# Patient Record
Sex: FEMALE | Race: WHITE | URBAN - METROPOLITAN AREA
[De-identification: names, ages, dates, MRNs, and addresses within clinical notes are randomized per-mention and may not be internally consistent; named-entity substitution may affect disease eponyms.]

---

## 2017-12-06 ENCOUNTER — OUTPATIENT (OUTPATIENT)
Dept: OUTPATIENT SERVICES | Facility: HOSPITAL | Age: 67
LOS: 1 days | Discharge: HOME | End: 2017-12-06

## 2017-12-06 DIAGNOSIS — C50.111 MALIGNANT NEOPLASM OF CENTRAL PORTION OF RIGHT FEMALE BREAST: ICD-10-CM

## 2021-07-28 ENCOUNTER — NON-APPOINTMENT (OUTPATIENT)
Age: 71
End: 2021-07-28

## 2021-07-28 ENCOUNTER — APPOINTMENT (OUTPATIENT)
Dept: ORTHOPEDIC SURGERY | Facility: CLINIC | Age: 71
End: 2021-07-28
Payer: MEDICARE

## 2021-07-28 DIAGNOSIS — Z85.3 PERSONAL HISTORY OF MALIGNANT NEOPLASM OF BREAST: ICD-10-CM

## 2021-07-28 DIAGNOSIS — Z78.9 OTHER SPECIFIED HEALTH STATUS: ICD-10-CM

## 2021-07-28 DIAGNOSIS — R73.03 PREDIABETES.: ICD-10-CM

## 2021-07-28 PROCEDURE — 99204 OFFICE O/P NEW MOD 45 MIN: CPT | Mod: 57

## 2021-07-28 PROCEDURE — 99072 ADDL SUPL MATRL&STAF TM PHE: CPT

## 2021-07-28 RX ORDER — IBUPROFEN 200 MG/1
200 TABLET ORAL
Refills: 0 | Status: ACTIVE | COMMUNITY
Start: 2021-07-28

## 2021-07-28 NOTE — PHYSICAL EXAM
[de-identified] : General appearance: well nourished and hydrated, pleasant, alert and oriented x 3, cooperative.\par Cardiovascular: no apparent abnormalities, no lower leg edema, no varicosities, pedal pulses are palpable.\par Neurologic: sensation is normal, no muscle weakness in upper or lower extremities\par Dermatologic no apparent skin lesions, moist, warm, no rash.\par Gait: nonantalgic.\par \par Left knee\par Inspection: no effusion or erythema.\par Wounds: none.\par Alignment: normal.\par Palpation: no specific tenderness on palpation.\par ROM: 0-100 degrees\par Ligamentous laxity: all ligaments appear stable\par Muscle Test: good quad strength.\par \par Right knee\par Inspection: no effusion or erythema.\par Wounds: none.\par Alignment: normal.\par Palpation: no specific tenderness on palpation.\par ROM: 0-100 degrees\par Ligamentous laxity: all ligaments appear stable\par Muscle Test: good quad strength.\par \par Left hip\par Inspection: No swelling or ecchymosis.\par Wounds: none.\par Palpation: non-tender.\par Stability: no instability.\par Strength: 5/5 all motor groups.\par ROM: Flexion to 70, ER 30,ABD 30 \par Leg length: equal.\par \par Right hip\par Inspection: No swelling or ecchymosis.\par Wounds: none.\par Palpation: non-tender.\par Stability: no instability.\par Strength: 5/5 all motor groups.\par ROM: ankylosed in a position of flexion and abduction, about 10 degrees ABD and 10 degrees of flexion \par Leg length: equal.\par \par   [de-identified] : Radiographs done outside  AP pelvis and lateral of both hips shows left hip hip has complete loss of superior lateral joint space. The right hip has complete loss of superior lateral joint space with migration of femoral head superior laterally with large medial osteophytes between femoral head and tear drop with singificant shortening.

## 2021-07-28 NOTE — HISTORY OF PRESENT ILLNESS
[de-identified] : 71 year old female presents to the office today complaining of right hip pain x 1 year. When asked where the hip pain is, patient reports pain that is at the lateral hip, unsure if it is in the groin. There is also pain at the anterior thigh radiating to the knee. Patient reports pain that is sharp and achy in nature. She reports a loss of motion in the hip. PAtient ambulates with a cane and states she can only ambulate about 2 blocks before pain stops her. There is pain and difficulty with negotiating stairs along with difficulty putting socks and shoes on. Pt reports taking Ibuprofen for pain with only some relief. Patient is here today to discuss her next options for pain relief.

## 2021-11-02 ENCOUNTER — RESULT REVIEW (OUTPATIENT)
Age: 71
End: 2021-11-02

## 2021-11-02 ENCOUNTER — OUTPATIENT (OUTPATIENT)
Dept: OUTPATIENT SERVICES | Facility: HOSPITAL | Age: 71
LOS: 1 days | Discharge: HOME | End: 2021-11-02
Payer: MEDICARE

## 2021-11-02 VITALS
RESPIRATION RATE: 16 BRPM | OXYGEN SATURATION: 100 % | TEMPERATURE: 97 F | SYSTOLIC BLOOD PRESSURE: 130 MMHG | WEIGHT: 162.04 LBS | HEIGHT: 65 IN | DIASTOLIC BLOOD PRESSURE: 78 MMHG | HEART RATE: 82 BPM

## 2021-11-02 DIAGNOSIS — Z95.828 PRESENCE OF OTHER VASCULAR IMPLANTS AND GRAFTS: Chronic | ICD-10-CM

## 2021-11-02 DIAGNOSIS — M16.11 UNILATERAL PRIMARY OSTEOARTHRITIS, RIGHT HIP: ICD-10-CM

## 2021-11-02 DIAGNOSIS — Z01.818 ENCOUNTER FOR OTHER PREPROCEDURAL EXAMINATION: ICD-10-CM

## 2021-11-02 DIAGNOSIS — Z98.890 OTHER SPECIFIED POSTPROCEDURAL STATES: Chronic | ICD-10-CM

## 2021-11-02 LAB
A1C WITH ESTIMATED AVERAGE GLUCOSE RESULT: 5 % — SIGNIFICANT CHANGE UP (ref 4–5.6)
ALBUMIN SERPL ELPH-MCNC: 4.2 G/DL — SIGNIFICANT CHANGE UP (ref 3.5–5.2)
ALP SERPL-CCNC: 101 U/L — SIGNIFICANT CHANGE UP (ref 30–115)
ALT FLD-CCNC: 20 U/L — SIGNIFICANT CHANGE UP (ref 0–41)
ANION GAP SERPL CALC-SCNC: 13 MMOL/L — SIGNIFICANT CHANGE UP (ref 7–14)
APTT BLD: 33.6 SEC — SIGNIFICANT CHANGE UP (ref 27–39.2)
AST SERPL-CCNC: 22 U/L — SIGNIFICANT CHANGE UP (ref 0–41)
BASOPHILS # BLD AUTO: 0.05 K/UL — SIGNIFICANT CHANGE UP (ref 0–0.2)
BASOPHILS NFR BLD AUTO: 0.8 % — SIGNIFICANT CHANGE UP (ref 0–1)
BILIRUB SERPL-MCNC: 0.6 MG/DL — SIGNIFICANT CHANGE UP (ref 0.2–1.2)
BLD GP AB SCN SERPL QL: SIGNIFICANT CHANGE UP
BUN SERPL-MCNC: 17 MG/DL — SIGNIFICANT CHANGE UP (ref 10–20)
CALCIUM SERPL-MCNC: 9.5 MG/DL — SIGNIFICANT CHANGE UP (ref 8.5–10.1)
CHLORIDE SERPL-SCNC: 104 MMOL/L — SIGNIFICANT CHANGE UP (ref 98–110)
CO2 SERPL-SCNC: 24 MMOL/L — SIGNIFICANT CHANGE UP (ref 17–32)
CREAT SERPL-MCNC: 0.6 MG/DL — LOW (ref 0.7–1.5)
EOSINOPHIL # BLD AUTO: 0.15 K/UL — SIGNIFICANT CHANGE UP (ref 0–0.7)
EOSINOPHIL NFR BLD AUTO: 2.4 % — SIGNIFICANT CHANGE UP (ref 0–8)
ESTIMATED AVERAGE GLUCOSE: 97 MG/DL — SIGNIFICANT CHANGE UP (ref 68–114)
GLUCOSE SERPL-MCNC: 67 MG/DL — LOW (ref 70–99)
HCT VFR BLD CALC: 41.2 % — SIGNIFICANT CHANGE UP (ref 37–47)
HGB BLD-MCNC: 13.8 G/DL — SIGNIFICANT CHANGE UP (ref 12–16)
IMM GRANULOCYTES NFR BLD AUTO: 0.2 % — SIGNIFICANT CHANGE UP (ref 0.1–0.3)
INR BLD: 1.01 RATIO — SIGNIFICANT CHANGE UP (ref 0.65–1.3)
LYMPHOCYTES # BLD AUTO: 2.01 K/UL — SIGNIFICANT CHANGE UP (ref 1.2–3.4)
LYMPHOCYTES # BLD AUTO: 32.2 % — SIGNIFICANT CHANGE UP (ref 20.5–51.1)
MCHC RBC-ENTMCNC: 30.6 PG — SIGNIFICANT CHANGE UP (ref 27–31)
MCHC RBC-ENTMCNC: 33.5 G/DL — SIGNIFICANT CHANGE UP (ref 32–37)
MCV RBC AUTO: 91.4 FL — SIGNIFICANT CHANGE UP (ref 81–99)
MONOCYTES # BLD AUTO: 0.37 K/UL — SIGNIFICANT CHANGE UP (ref 0.1–0.6)
MONOCYTES NFR BLD AUTO: 5.9 % — SIGNIFICANT CHANGE UP (ref 1.7–9.3)
MRSA PCR RESULT.: NEGATIVE — SIGNIFICANT CHANGE UP
NEUTROPHILS # BLD AUTO: 3.66 K/UL — SIGNIFICANT CHANGE UP (ref 1.4–6.5)
NEUTROPHILS NFR BLD AUTO: 58.5 % — SIGNIFICANT CHANGE UP (ref 42.2–75.2)
NRBC # BLD: 0 /100 WBCS — SIGNIFICANT CHANGE UP (ref 0–0)
PLATELET # BLD AUTO: 237 K/UL — SIGNIFICANT CHANGE UP (ref 130–400)
POTASSIUM SERPL-MCNC: 4 MMOL/L — SIGNIFICANT CHANGE UP (ref 3.5–5)
POTASSIUM SERPL-SCNC: 4 MMOL/L — SIGNIFICANT CHANGE UP (ref 3.5–5)
PROT SERPL-MCNC: 7.1 G/DL — SIGNIFICANT CHANGE UP (ref 6–8)
PROTHROM AB SERPL-ACNC: 11.6 SEC — SIGNIFICANT CHANGE UP (ref 9.95–12.87)
RBC # BLD: 4.51 M/UL — SIGNIFICANT CHANGE UP (ref 4.2–5.4)
RBC # FLD: 12.7 % — SIGNIFICANT CHANGE UP (ref 11.5–14.5)
SODIUM SERPL-SCNC: 141 MMOL/L — SIGNIFICANT CHANGE UP (ref 135–146)
WBC # BLD: 6.25 K/UL — SIGNIFICANT CHANGE UP (ref 4.8–10.8)
WBC # FLD AUTO: 6.25 K/UL — SIGNIFICANT CHANGE UP (ref 4.8–10.8)

## 2021-11-02 PROCEDURE — 93010 ELECTROCARDIOGRAM REPORT: CPT

## 2021-11-02 PROCEDURE — 71046 X-RAY EXAM CHEST 2 VIEWS: CPT | Mod: 26

## 2021-11-02 PROCEDURE — 73502 X-RAY EXAM HIP UNI 2-3 VIEWS: CPT | Mod: 26,RT

## 2021-11-02 NOTE — H&P PST ADULT - NSANTHOSAYNRD_GEN_A_CORE
No. FIORDALIZA screening performed.  STOP BANG Legend: 0-2 = LOW Risk; 3-4 = INTERMEDIATE Risk; 5-8 = HIGH Risk

## 2021-11-02 NOTE — H&P PST ADULT - NSICDXPASTSURGICALHX_GEN_ALL_CORE_FT
PAST SURGICAL HISTORY:  H/O ovarian cystectomy     Port-A-Cath in place     S/P lumpectomy of breast

## 2021-11-02 NOTE — H&P PST ADULT - HISTORY OF PRESENT ILLNESS
Pt states she has had OA in both hips for many years. Right is worse than left. Since 2020, pain in right hip has been worsening. Described as stabbing pain rated 9/10 on most days. Pain is worse when trying to lift her leg or when attempting to be active. Admits to using Tylenol for pain but does not get much relief. Now for listed procedure.     Denies any chest pain, difficulty breathing, SOB, palpitations, dysuria, URI, or any other infections in the last 2 weeks. Denies any recent travel, contact, or exposure to any persons with known or suspected COVID-19. Pt also denies COVID testing within the last 2 weeks. Pt admits to receiving all doses of Moderna COVID vaccine. Denies any suicidal or homicidal ideations. Pt advised to self quarantine until day of procedure. Exercise tolerance of 1 flight of stairs without dyspnea but pt has been severely limited d/t hip pain. FIORDALIZA reviewed with patient. Pt verbalized understanding of all pre-operative instructions.    Anesthesia Alert  NO--Difficult Airway  NO--History of neck surgery or radiation  NO--Limited ROM of neck  NO--History of Malignant hyperthermia  NO--No personal or family history of Pseudocholinesterase deficiency.  YES--Prior Anesthesia Complication: delayed awakening  NO--Latex Allergy  NO--Loose teeth  NO--History of Rheumatoid Arthritis  NO--FIORDALIZA  NO--Bleeding Risk  NO--Other_____

## 2021-11-02 NOTE — H&P PST ADULT - REASON FOR ADMISSION
72 yo female presents for PAST in preparation for right total hip replacement on 11/11/2021 under regional anesthesia by Dr. Anderson ([facility]).

## 2021-11-08 ENCOUNTER — LABORATORY RESULT (OUTPATIENT)
Age: 71
End: 2021-11-08

## 2021-11-08 ENCOUNTER — OUTPATIENT (OUTPATIENT)
Dept: OUTPATIENT SERVICES | Facility: HOSPITAL | Age: 71
LOS: 1 days | Discharge: HOME | End: 2021-11-08

## 2021-11-08 DIAGNOSIS — Z95.828 PRESENCE OF OTHER VASCULAR IMPLANTS AND GRAFTS: Chronic | ICD-10-CM

## 2021-11-08 DIAGNOSIS — Z98.890 OTHER SPECIFIED POSTPROCEDURAL STATES: Chronic | ICD-10-CM

## 2021-11-08 DIAGNOSIS — Z11.59 ENCOUNTER FOR SCREENING FOR OTHER VIRAL DISEASES: ICD-10-CM

## 2021-11-10 ENCOUNTER — FORM ENCOUNTER (OUTPATIENT)
Age: 71
End: 2021-11-10

## 2021-11-11 ENCOUNTER — RESULT REVIEW (OUTPATIENT)
Age: 71
End: 2021-11-11

## 2021-11-11 ENCOUNTER — APPOINTMENT (OUTPATIENT)
Dept: ORTHOPEDIC SURGERY | Facility: HOSPITAL | Age: 71
End: 2021-11-11
Payer: MEDICARE

## 2021-11-11 ENCOUNTER — OUTPATIENT (OUTPATIENT)
Dept: INPATIENT UNIT | Facility: HOSPITAL | Age: 71
LOS: 1 days | Discharge: HOME HEALTH | End: 2021-11-11
Payer: MEDICARE

## 2021-11-11 VITALS
SYSTOLIC BLOOD PRESSURE: 153 MMHG | RESPIRATION RATE: 18 BRPM | HEIGHT: 67 IN | DIASTOLIC BLOOD PRESSURE: 73 MMHG | TEMPERATURE: 97 F | OXYGEN SATURATION: 100 % | HEART RATE: 65 BPM | WEIGHT: 162.04 LBS

## 2021-11-11 DIAGNOSIS — Z98.890 OTHER SPECIFIED POSTPROCEDURAL STATES: Chronic | ICD-10-CM

## 2021-11-11 DIAGNOSIS — M16.11 UNILATERAL PRIMARY OSTEOARTHRITIS, RIGHT HIP: ICD-10-CM

## 2021-11-11 DIAGNOSIS — Z95.828 PRESENCE OF OTHER VASCULAR IMPLANTS AND GRAFTS: Chronic | ICD-10-CM

## 2021-11-11 LAB
ABO RH CONFIRMATION: SIGNIFICANT CHANGE UP
GLUCOSE BLDC GLUCOMTR-MCNC: 171 MG/DL — HIGH (ref 70–99)
GLUCOSE BLDC GLUCOMTR-MCNC: 81 MG/DL — SIGNIFICANT CHANGE UP (ref 70–99)

## 2021-11-11 PROCEDURE — 99231 SBSQ HOSP IP/OBS SF/LOW 25: CPT

## 2021-11-11 PROCEDURE — 88305 TISSUE EXAM BY PATHOLOGIST: CPT | Mod: 26

## 2021-11-11 PROCEDURE — 27130 TOTAL HIP ARTHROPLASTY: CPT | Mod: RT

## 2021-11-11 PROCEDURE — 88311 DECALCIFY TISSUE: CPT | Mod: 26

## 2021-11-11 RX ORDER — PANTOPRAZOLE SODIUM 20 MG/1
40 TABLET, DELAYED RELEASE ORAL
Refills: 0 | Status: DISCONTINUED | OUTPATIENT
Start: 2021-11-11 | End: 2021-11-12

## 2021-11-11 RX ORDER — OXYCODONE AND ACETAMINOPHEN 5; 325 MG/1; MG/1
1 TABLET ORAL ONCE
Refills: 0 | Status: DISCONTINUED | OUTPATIENT
Start: 2021-11-11 | End: 2021-11-11

## 2021-11-11 RX ORDER — CELECOXIB 200 MG/1
200 CAPSULE ORAL EVERY 12 HOURS
Refills: 0 | Status: DISCONTINUED | OUTPATIENT
Start: 2021-11-12 | End: 2021-11-12

## 2021-11-11 RX ORDER — DEXAMETHASONE 0.5 MG/5ML
2 ELIXIR ORAL ONCE
Refills: 0 | Status: COMPLETED | OUTPATIENT
Start: 2021-11-12 | End: 2021-11-12

## 2021-11-11 RX ORDER — HYDROMORPHONE HYDROCHLORIDE 2 MG/ML
0.5 INJECTION INTRAMUSCULAR; INTRAVENOUS; SUBCUTANEOUS
Refills: 0 | Status: DISCONTINUED | OUTPATIENT
Start: 2021-11-11 | End: 2021-11-11

## 2021-11-11 RX ORDER — OXYCODONE HYDROCHLORIDE 5 MG/1
5 TABLET ORAL EVERY 4 HOURS
Refills: 0 | Status: DISCONTINUED | OUTPATIENT
Start: 2021-11-11 | End: 2021-11-12

## 2021-11-11 RX ORDER — SODIUM CHLORIDE 9 MG/ML
1000 INJECTION INTRAMUSCULAR; INTRAVENOUS; SUBCUTANEOUS
Refills: 0 | Status: DISCONTINUED | OUTPATIENT
Start: 2021-11-11 | End: 2021-11-12

## 2021-11-11 RX ORDER — CELECOXIB 200 MG/1
200 CAPSULE ORAL ONCE
Refills: 0 | Status: COMPLETED | OUTPATIENT
Start: 2021-11-11 | End: 2021-11-11

## 2021-11-11 RX ORDER — TRAMADOL HYDROCHLORIDE 50 MG/1
50 TABLET ORAL EVERY 4 HOURS
Refills: 0 | Status: DISCONTINUED | OUTPATIENT
Start: 2021-11-11 | End: 2021-11-12

## 2021-11-11 RX ORDER — SENNA PLUS 8.6 MG/1
2 TABLET ORAL AT BEDTIME
Refills: 0 | Status: DISCONTINUED | OUTPATIENT
Start: 2021-11-11 | End: 2021-11-12

## 2021-11-11 RX ORDER — ACETAMINOPHEN 500 MG
650 TABLET ORAL EVERY 6 HOURS
Refills: 0 | Status: DISCONTINUED | OUTPATIENT
Start: 2021-11-11 | End: 2021-11-12

## 2021-11-11 RX ORDER — CHLORHEXIDINE GLUCONATE 213 G/1000ML
1 SOLUTION TOPICAL
Refills: 0 | Status: DISCONTINUED | OUTPATIENT
Start: 2021-11-11 | End: 2021-11-12

## 2021-11-11 RX ORDER — FERROUS SULFATE 325(65) MG
325 TABLET ORAL DAILY
Refills: 0 | Status: DISCONTINUED | OUTPATIENT
Start: 2021-11-11 | End: 2021-11-12

## 2021-11-11 RX ORDER — KETOROLAC TROMETHAMINE 30 MG/ML
15 SYRINGE (ML) INJECTION EVERY 6 HOURS
Refills: 0 | Status: DISCONTINUED | OUTPATIENT
Start: 2021-11-11 | End: 2021-11-12

## 2021-11-11 RX ORDER — CEFAZOLIN SODIUM 1 G
2000 VIAL (EA) INJECTION EVERY 8 HOURS
Refills: 0 | Status: COMPLETED | OUTPATIENT
Start: 2021-11-11 | End: 2021-11-11

## 2021-11-11 RX ORDER — SODIUM CHLORIDE 9 MG/ML
1000 INJECTION, SOLUTION INTRAVENOUS
Refills: 0 | Status: DISCONTINUED | OUTPATIENT
Start: 2021-11-11 | End: 2021-11-11

## 2021-11-11 RX ORDER — ONDANSETRON 8 MG/1
4 TABLET, FILM COATED ORAL EVERY 6 HOURS
Refills: 0 | Status: DISCONTINUED | OUTPATIENT
Start: 2021-11-11 | End: 2021-11-12

## 2021-11-11 RX ORDER — ASPIRIN/CALCIUM CARB/MAGNESIUM 324 MG
81 TABLET ORAL EVERY 12 HOURS
Refills: 0 | Status: DISCONTINUED | OUTPATIENT
Start: 2021-11-11 | End: 2021-11-12

## 2021-11-11 RX ORDER — POLYETHYLENE GLYCOL 3350 17 G/17G
17 POWDER, FOR SOLUTION ORAL AT BEDTIME
Refills: 0 | Status: DISCONTINUED | OUTPATIENT
Start: 2021-11-11 | End: 2021-11-12

## 2021-11-11 RX ORDER — ACETAMINOPHEN 500 MG
1000 TABLET ORAL ONCE
Refills: 0 | Status: COMPLETED | OUTPATIENT
Start: 2021-11-11 | End: 2021-11-11

## 2021-11-11 RX ADMIN — SODIUM CHLORIDE 100 MILLILITER(S): 9 INJECTION, SOLUTION INTRAVENOUS at 12:05

## 2021-11-11 RX ADMIN — Medication 15 MILLIGRAM(S): at 19:21

## 2021-11-11 RX ADMIN — Medication 650 MILLIGRAM(S): at 12:37

## 2021-11-11 RX ADMIN — HYDROMORPHONE HYDROCHLORIDE 0.5 MILLIGRAM(S): 2 INJECTION INTRAMUSCULAR; INTRAVENOUS; SUBCUTANEOUS at 12:03

## 2021-11-11 RX ADMIN — Medication 325 MILLIGRAM(S): at 12:37

## 2021-11-11 RX ADMIN — Medication 100 MILLIGRAM(S): at 17:36

## 2021-11-11 RX ADMIN — Medication 650 MILLIGRAM(S): at 23:57

## 2021-11-11 RX ADMIN — CELECOXIB 200 MILLIGRAM(S): 200 CAPSULE ORAL at 06:32

## 2021-11-11 RX ADMIN — SODIUM CHLORIDE 100 MILLILITER(S): 9 INJECTION INTRAMUSCULAR; INTRAVENOUS; SUBCUTANEOUS at 17:41

## 2021-11-11 RX ADMIN — Medication 81 MILLIGRAM(S): at 17:42

## 2021-11-11 RX ADMIN — Medication 100 MILLIGRAM(S): at 23:59

## 2021-11-11 RX ADMIN — Medication 1000 MILLIGRAM(S): at 06:32

## 2021-11-11 RX ADMIN — Medication 650 MILLIGRAM(S): at 19:20

## 2021-11-11 RX ADMIN — Medication 1 TABLET(S): at 12:36

## 2021-11-11 RX ADMIN — Medication 650 MILLIGRAM(S): at 13:51

## 2021-11-11 RX ADMIN — HYDROMORPHONE HYDROCHLORIDE 0.5 MILLIGRAM(S): 2 INJECTION INTRAMUSCULAR; INTRAVENOUS; SUBCUTANEOUS at 12:29

## 2021-11-11 RX ADMIN — Medication 650 MILLIGRAM(S): at 17:42

## 2021-11-11 RX ADMIN — Medication 15 MILLIGRAM(S): at 17:42

## 2021-11-11 RX ADMIN — SENNA PLUS 2 TABLET(S): 8.6 TABLET ORAL at 22:52

## 2021-11-11 RX ADMIN — POLYETHYLENE GLYCOL 3350 17 GRAM(S): 17 POWDER, FOR SOLUTION ORAL at 22:52

## 2021-11-11 NOTE — PHYSICAL THERAPY INITIAL EVALUATION ADULT - RANGE OF MOTION EXAMINATION, REHAB EVAL
(R) hip AAROM flexion 0-80 , abduction 0-15 degrees , rest RLE and LLE WFL grossly throughout , assessed in semi-reclined position

## 2021-11-11 NOTE — PHYSICAL THERAPY INITIAL EVALUATION ADULT - ADDITIONAL COMMENTS
Per patient , resides alone in a private home , but daughter will be staying with her upon D/C.  Ambulating with a cane prior to surgery, also has a rolling walker.  +5 steps to enter home from front entrance with railing on (R) side , or can enter through side door which has 5 steps and (L) railing.  12 steps down to basement, however, patient says she can remain on 1st floor only if needed. Ambulatory

## 2021-11-11 NOTE — ASU PREOP CHECKLIST - IV STARTED

## 2021-11-11 NOTE — PHYSICAL THERAPY INITIAL EVALUATION ADULT - GAIT DEVIATIONS NOTED, PT EVAL
decreased heel strike / push off , decreased WB RLE/decreased pola/increased time in double stance/decreased step length/decreased weight-shifting ability

## 2021-11-11 NOTE — PROGRESS NOTE ADULT - ASSESSMENT
70 yo F with no significant pmhx s/p right hip arthroplasty.    #Right hip arthritis s/p right hip arthroplasty 11/11  - Post-op care per orthopedics team  - Cont PT  - Pain control PRN    Vital signs stable. Labs reviewed without no significant abnormalities.    Please call for any questions or concerns  [phone]

## 2021-11-11 NOTE — PROGRESS NOTE ADULT - ASSESSMENT
s/p right thr pod 0       pain control   dvt proph   am labs pt ot   wbat   abx 24 hours   incentive   med cs   dispo planning

## 2021-11-11 NOTE — PHYSICAL THERAPY INITIAL EVALUATION ADULT - PATIENT/FAMILY/SIGNIFICANT OTHER GOALS STATEMENT, PT EVAL
Patient wants to be able to walk with rolling walker , wants to be able to clean/maintain her household

## 2021-11-11 NOTE — PHYSICAL THERAPY INITIAL EVALUATION ADULT - IMPAIRED TRANSFERS: SIT/STAND, REHAB EVAL
decreased endurance/impaired balance/decreased flexibility/narrow base of support/impaired postural control/decreased ROM/decreased strength

## 2021-11-11 NOTE — PHYSICAL THERAPY INITIAL EVALUATION ADULT - GENERAL OBSERVATIONS, REHAB EVAL
13:55 - 14:25. Chart reviewed. Patient available to be seen for physical therapy, confirmed with nurse. Patient encountered semi-reclined in bed, +aquacel (R) hip. Denies pain at rest and is ready for PT evaluation now.

## 2021-11-12 ENCOUNTER — TRANSCRIPTION ENCOUNTER (OUTPATIENT)
Age: 71
End: 2021-11-12

## 2021-11-12 VITALS
HEART RATE: 77 BPM | SYSTOLIC BLOOD PRESSURE: 113 MMHG | TEMPERATURE: 97 F | RESPIRATION RATE: 16 BRPM | DIASTOLIC BLOOD PRESSURE: 77 MMHG

## 2021-11-12 LAB
ANION GAP SERPL CALC-SCNC: 12 MMOL/L — SIGNIFICANT CHANGE UP (ref 7–14)
BUN SERPL-MCNC: 22 MG/DL — HIGH (ref 10–20)
CALCIUM SERPL-MCNC: 8.6 MG/DL — SIGNIFICANT CHANGE UP (ref 8.5–10.1)
CHLORIDE SERPL-SCNC: 106 MMOL/L — SIGNIFICANT CHANGE UP (ref 98–110)
CO2 SERPL-SCNC: 23 MMOL/L — SIGNIFICANT CHANGE UP (ref 17–32)
CREAT SERPL-MCNC: 0.6 MG/DL — LOW (ref 0.7–1.5)
GLUCOSE SERPL-MCNC: 102 MG/DL — HIGH (ref 70–99)
HCT VFR BLD CALC: 29.5 % — LOW (ref 37–47)
HGB BLD-MCNC: 10.1 G/DL — LOW (ref 12–16)
MCHC RBC-ENTMCNC: 30.7 PG — SIGNIFICANT CHANGE UP (ref 27–31)
MCHC RBC-ENTMCNC: 34.2 G/DL — SIGNIFICANT CHANGE UP (ref 32–37)
MCV RBC AUTO: 89.7 FL — SIGNIFICANT CHANGE UP (ref 81–99)
NRBC # BLD: 0 /100 WBCS — SIGNIFICANT CHANGE UP (ref 0–0)
PLATELET # BLD AUTO: 178 K/UL — SIGNIFICANT CHANGE UP (ref 130–400)
POTASSIUM SERPL-MCNC: 4.2 MMOL/L — SIGNIFICANT CHANGE UP (ref 3.5–5)
POTASSIUM SERPL-SCNC: 4.2 MMOL/L — SIGNIFICANT CHANGE UP (ref 3.5–5)
RBC # BLD: 3.29 M/UL — LOW (ref 4.2–5.4)
RBC # FLD: 12.2 % — SIGNIFICANT CHANGE UP (ref 11.5–14.5)
SODIUM SERPL-SCNC: 141 MMOL/L — SIGNIFICANT CHANGE UP (ref 135–146)
WBC # BLD: 10.63 K/UL — SIGNIFICANT CHANGE UP (ref 4.8–10.8)
WBC # FLD AUTO: 10.63 K/UL — SIGNIFICANT CHANGE UP (ref 4.8–10.8)

## 2021-11-12 RX ORDER — OXYCODONE HYDROCHLORIDE 5 MG/1
1 TABLET ORAL
Qty: 12 | Refills: 0
Start: 2021-11-12 | End: 2021-11-15

## 2021-11-12 RX ORDER — ACETAMINOPHEN 500 MG
2 TABLET ORAL
Qty: 0 | Refills: 0 | DISCHARGE

## 2021-11-12 RX ORDER — PANTOPRAZOLE SODIUM 20 MG/1
1 TABLET, DELAYED RELEASE ORAL
Qty: 40 | Refills: 0
Start: 2021-11-12 | End: 2021-12-21

## 2021-11-12 RX ORDER — ASPIRIN/CALCIUM CARB/MAGNESIUM 324 MG
1 TABLET ORAL
Qty: 80 | Refills: 0
Start: 2021-11-12 | End: 2021-12-21

## 2021-11-12 RX ORDER — CELECOXIB 200 MG/1
1 CAPSULE ORAL
Qty: 28 | Refills: 0
Start: 2021-11-12 | End: 2021-11-25

## 2021-11-12 RX ORDER — ACETAMINOPHEN 500 MG
2 TABLET ORAL
Qty: 96 | Refills: 0
Start: 2021-11-12 | End: 2021-11-23

## 2021-11-12 RX ORDER — TRAMADOL HYDROCHLORIDE 50 MG/1
1 TABLET ORAL
Qty: 24 | Refills: 0
Start: 2021-11-12 | End: 2021-11-17

## 2021-11-12 RX ADMIN — Medication 2 MILLIGRAM(S): at 10:03

## 2021-11-12 RX ADMIN — Medication 15 MILLIGRAM(S): at 07:53

## 2021-11-12 RX ADMIN — Medication 1 TABLET(S): at 11:35

## 2021-11-12 RX ADMIN — Medication 15 MILLIGRAM(S): at 11:36

## 2021-11-12 RX ADMIN — Medication 81 MILLIGRAM(S): at 05:19

## 2021-11-12 RX ADMIN — Medication 325 MILLIGRAM(S): at 11:36

## 2021-11-12 RX ADMIN — Medication 650 MILLIGRAM(S): at 07:53

## 2021-11-12 RX ADMIN — Medication 650 MILLIGRAM(S): at 05:18

## 2021-11-12 RX ADMIN — PANTOPRAZOLE SODIUM 40 MILLIGRAM(S): 20 TABLET, DELAYED RELEASE ORAL at 05:19

## 2021-11-12 RX ADMIN — Medication 650 MILLIGRAM(S): at 01:00

## 2021-11-12 RX ADMIN — Medication 15 MILLIGRAM(S): at 05:18

## 2021-11-12 NOTE — DISCHARGE NOTE PROVIDER - NSDCMRMEDTOKEN_GEN_ALL_CORE_FT
acetaminophen 325 mg oral tablet: 2 tab(s) orally every 6 hours MDD:8  aspirin 81 mg oral delayed release tablet: 1 tab(s) orally every 12 hours MDD:2  celecoxib 200 mg oral capsule: 1 cap(s) orally every 12 hours MDD:2  oxyCODONE 5 mg oral tablet: 1 tab(s) orally every 8 hours, As Needed -Severe Pain (7 - 10) MDD:3  pantoprazole 40 mg oral delayed release tablet: 1 tab(s) orally once a day (before a meal) MDD:1  traMADol 50 mg oral tablet: 1 tab(s) orally every 6 hours, As Needed -Moderate Pain (4 - 6) MDD:4

## 2021-11-12 NOTE — OCCUPATIONAL THERAPY INITIAL EVALUATION ADULT - GENERAL OBSERVATIONS, REHAB EVAL
11:40-12:25 chart reviewed, ok to treat by Occupational Therapist as confirmed by RN Irina Pt received sitting in bedside chair + aquacel dressing R hip in NAD. Pt. in agreement with OT IE

## 2021-11-12 NOTE — PROGRESS NOTE ADULT - SUBJECTIVE AND OBJECTIVE BOX
71 y o F s/p right preet pod 1    MEDICATIONS  (STANDING):  acetaminophen     Tablet .. 650 milliGRAM(s) Oral every 6 hours  aspirin enteric coated 81 milliGRAM(s) Oral every 12 hours  celecoxib 200 milliGRAM(s) Oral every 12 hours  chlorhexidine 4% Liquid 1 Application(s) Topical <User Schedule>  dexAMETHasone     Tablet 2 milliGRAM(s) Oral once  ferrous    sulfate 325 milliGRAM(s) Oral daily  ketorolac   Injectable 15 milliGRAM(s) IV Push every 6 hours  multivitamin 1 Tablet(s) Oral daily  pantoprazole    Tablet 40 milliGRAM(s) Oral before breakfast  polyethylene glycol 3350 17 Gram(s) Oral at bedtime  senna 2 Tablet(s) Oral at bedtime  sodium chloride 0.9%. 1000 milliLiter(s) (100 mL/Hr) IV Continuous <Continuous>    MEDICATIONS  (PRN):  aluminum hydroxide/magnesium hydroxide/simethicone Suspension 30 milliLiter(s) Oral four times a day PRN Indigestion  ondansetron Injectable 4 milliGRAM(s) IV Push every 6 hours PRN Nausea and/or Vomiting  oxyCODONE    IR 5 milliGRAM(s) Oral every 4 hours PRN Severe Pain (7 - 10)  traMADol 50 milliGRAM(s) Oral every 4 hours PRN Moderate Pain (4 - 6)      Pt s/e at bedside, pain is controlled with pain meds, no CP, SOB, N/V, walked , no problems with urination      NAD    Vital Signs Last 24 Hrs  T(C): 35.7 (12 Nov 2021 04:00), Max: 36.5 (11 Nov 2021 11:43)  T(F): 96.3 (12 Nov 2021 04:00), Max: 97.7 (11 Nov 2021 11:43)  HR: 63 (12 Nov 2021 04:00) (52 - 86)  BP: 109/55 (12 Nov 2021 04:00) (105/77 - 137/58)  BP(mean): --  RR: 16 (12 Nov 2021 04:00) (16 - 17)  SpO2: 96% (11 Nov 2021 13:15) (95% - 97%)    PE:   dressing d/c/i  compartments soft  NVID, sensation intact  foot wwp                          10.1   10.63 )-----------( 178      ( 12 Nov 2021 07:18 )             29.5     bmp pending    PT/OT, wbat  pain control  dvt ppx - asa 81 bid x 6 weeks  GI ppx  f/u bmp  postop abx completed  home meds  IS  d/c planning  
  ZARIA, WAVE  71y, Female  Allergy: No Known Allergies    Hospital Day:     Patient seen and examined. No acute complaints. Pt ambulated well with PT today.    PMH/PSH:  PAST MEDICAL & SURGICAL HISTORY:  Breast CA    S/P lumpectomy of breast    Port-A-Cath in place    H/O ovarian cystectomy        VITALS:  T(F): 96.9 (11-11-21 @ 13:45), Max: 97.7 (11-11-21 @ 11:43)  HR: 52 (11-11-21 @ 13:45)  BP: 119/58 (11-11-21 @ 13:45) (105/77 - 153/73)  RR: 16 (11-11-21 @ 13:45)  SpO2: 96% (11-11-21 @ 13:15)    TESTS & MEASUREMENTS:  Weight (Kg): 73.5 (11-11-21 @ 07:10)  BMI (kg/m2): 25.4 (11-11)    11-11-21 @ 07:01  -  11-11-21 @ 16:39  --------------------------------------------------------  IN: 0 mL / OUT: 600 mL / NET: -600 mL                            RADIOLOGY & ADDITIONAL TESTS:    RECENT DIAGNOSTIC ORDERS:  Surgical Pathology Report: 09:39 (11-11-21 @ 14:36)  COVID-19 Alfonso Domain Antibody: AM Sched. Collection: 12-Nov-2021 04:30 (11-11-21 @ 10:51)  COVID-19 Nucleocapsid Antibody: AM Sched. Collection: 12-Nov-2021 04:30 (11-11-21 @ 10:51)  Xray Hip 2-3 Views, Right: 07:55  Exam Completed (11-11-21 @ 07:56)  Complete Blood Count: AM Sched. Collection: 12-Nov-2021 04:30 (11-11-21 @ 07:00)  Complete Blood Count: STAT  Addl Info: Upon arrival to PACU or Floor (11-11-21 @ 07:00)  Basic Metabolic Panel: AM Sched. Collection: 12-Nov-2021 04:30 (11-11-21 @ 07:00)  Diet, Regular (11-11-21 @ 07:00)      MEDICATIONS:  MEDICATIONS  (STANDING):  acetaminophen     Tablet .. 650 milliGRAM(s) Oral every 6 hours  aspirin enteric coated 81 milliGRAM(s) Oral every 12 hours  ceFAZolin   IVPB 2000 milliGRAM(s) IV Intermittent every 8 hours  chlorhexidine 4% Liquid 1 Application(s) Topical <User Schedule>  ferrous    sulfate 325 milliGRAM(s) Oral daily  ketorolac   Injectable 15 milliGRAM(s) IV Push every 6 hours  multivitamin 1 Tablet(s) Oral daily  pantoprazole    Tablet 40 milliGRAM(s) Oral before breakfast  polyethylene glycol 3350 17 Gram(s) Oral at bedtime  senna 2 Tablet(s) Oral at bedtime  sodium chloride 0.9%. 1000 milliLiter(s) (100 mL/Hr) IV Continuous <Continuous>    MEDICATIONS  (PRN):  aluminum hydroxide/magnesium hydroxide/simethicone Suspension 30 milliLiter(s) Oral four times a day PRN Indigestion  ondansetron Injectable 4 milliGRAM(s) IV Push every 6 hours PRN Nausea and/or Vomiting  oxyCODONE    IR 5 milliGRAM(s) Oral every 4 hours PRN Severe Pain (7 - 10)  traMADol 50 milliGRAM(s) Oral every 4 hours PRN Moderate Pain (4 - 6)      HOME MEDICATIONS:  acetaminophen 325 mg oral tablet (11-11)      REVIEW OF SYSTEMS:  All other review of systems is negative unless indicated above.     PHYSICAL EXAM:  PHYSICAL EXAM:  GENERAL: NAD, well-developed  HEAD:  Atraumatic, Normocephalic  NECK: Supple, No JVD  CHEST/LUNG: Clear to auscultation bilaterally; No wheeze  HEART: Regular rate and rhythm; No murmurs, rubs, or gallops  ABDOMEN: Soft, Nontender, Nondistended; Bowel sounds present  EXTREMITIES:  2+ Peripheral Pulses, No clubbing, cyanosis, or edema  PSYCH: AAOx3  SKIN: No rashes or lesions      
post op note   s/p right thr pod 0  seen at bedside no complaints pain controlled   ambulated with pt     Vital Signs Last 24 Hrs  T(C): 35.7 (11 Nov 2021 18:08), Max: 36.5 (11 Nov 2021 11:43)  T(F): 96.3 (11 Nov 2021 18:08), Max: 97.7 (11 Nov 2021 11:43)  HR: 69 (11 Nov 2021 18:08) (52 - 69)  BP: 132/60 (11 Nov 2021 18:08) (105/77 - 153/73)  BP(mean): --  RR: 16 (11 Nov 2021 18:08) (16 - 18)  SpO2: 96% (11 Nov 2021 13:15) (95% - 100%)    right hip: dressing in place c/d/i   nvid   no calf pain   soraya x 2

## 2021-11-12 NOTE — OCCUPATIONAL THERAPY INITIAL EVALUATION ADULT - NS ASR DRESSING EQUIP NEEDS
Pt. was advised on the benefit of AE for LBD in order to maintain total hip precautions, increase safety and independence. Pt. demonstrated good understanding and in agreement./sock aide/reacher/dressing stick/long handled shoe horn

## 2021-11-12 NOTE — ANESTHESIA FOLLOW-UP NOTE - NSINTERVIEW_GEN_ALL_CORE
Addended by: JENNA COKER on: 4/8/2019 10:09 AM     Modules accepted: Orders    
Interviewed and evaluated

## 2021-11-12 NOTE — OCCUPATIONAL THERAPY INITIAL EVALUATION ADULT - LIVES WITH, PROFILE
Lives alone in a private home, daughter will be staying with patient upon discharge. 5 steps to enter with R side railing, 5 steps from the side door with L side railing, and 12 steps to the basement. Pt. will be staying on the first floor and not using the basement +tub/alone

## 2021-11-12 NOTE — OCCUPATIONAL THERAPY INITIAL EVALUATION ADULT - LEVEL OF INDEPENDENCE: DRESS LOWER BODY, OT EVAL
Pt. performed dressing tasks in hospital clothing due to lack of personal clothes available./minimum assist (75% patients effort)

## 2021-11-12 NOTE — DISCHARGE NOTE PROVIDER - NSDCCPCAREPLAN_GEN_ALL_CORE_FT
PRINCIPAL DISCHARGE DIAGNOSIS  Diagnosis: Status post total hip replacement, right  Assessment and Plan of Treatment:

## 2021-11-12 NOTE — DISCHARGE NOTE PROVIDER - CARE PROVIDER_API CALL
Breezy Anderson Pete  Orthopedic Surgery  [address] [place] [id number]  Phone: [phone]  Fax: [phone]  Follow Up Time:

## 2021-11-12 NOTE — OCCUPATIONAL THERAPY INITIAL EVALUATION ADULT - REHAB POTENTIAL, OT EVAL
Pt. demonstrated good understanding of home safety, adaptive equipment and safe car transfer/good, to achieve stated therapy goals

## 2021-11-12 NOTE — DISCHARGE NOTE PROVIDER - NSDCCPGOAL_GEN_ALL_CORE_FT
To get better and follow your care plan as instructed. PT/OT, wbat, cont aspiriin 81 mg bid x 6 weeks for dvt ppx, cont protonix for GI ppx, keep postop dressing 1 week, may shower, f/u as OP in 2 weeks with dr Anderson

## 2021-11-12 NOTE — DISCHARGE NOTE PROVIDER - HOSPITAL COURSE
11/11/21- right total hip arthroplasty, routine postop course: PT/OT, wbat, pain control, DVT/GI ppx, postop abx, postop labs WNL

## 2021-11-12 NOTE — DISCHARGE NOTE NURSING/CASE MANAGEMENT/SOCIAL WORK - PATIENT PORTAL LINK FT
You can access the FollowMyHealth Patient Portal offered by [facility] by registering at the following website: [email or web address]. By joining [employer]’s FollowMyHealth portal, you will also be able to view your health information using other applications (apps) compatible with our system.

## 2021-11-13 LAB
COVID-19 NUCLEOCAPSID GAM AB INTERP: NEGATIVE — SIGNIFICANT CHANGE UP
COVID-19 NUCLEOCAPSID TOTAL GAM ANTIBODY RESULT: 0.08 INDEX — SIGNIFICANT CHANGE UP
COVID-19 SPIKE DOMAIN AB INTERP: POSITIVE
COVID-19 SPIKE DOMAIN ANTIBODY RESULT: >250 U/ML — HIGH
SARS-COV-2 IGG+IGM SERPL QL IA: 0.08 INDEX — SIGNIFICANT CHANGE UP
SARS-COV-2 IGG+IGM SERPL QL IA: >250 U/ML — HIGH
SARS-COV-2 IGG+IGM SERPL QL IA: NEGATIVE — SIGNIFICANT CHANGE UP
SARS-COV-2 IGG+IGM SERPL QL IA: POSITIVE

## 2021-11-15 LAB — SURGICAL PATHOLOGY STUDY: SIGNIFICANT CHANGE UP

## 2021-11-22 RX ORDER — TRAMADOL HYDROCHLORIDE 50 MG/1
50 TABLET, COATED ORAL
Qty: 28 | Refills: 0 | Status: ACTIVE | COMMUNITY
Start: 2021-11-22 | End: 1900-01-01

## 2021-11-24 ENCOUNTER — APPOINTMENT (OUTPATIENT)
Dept: ORTHOPEDIC SURGERY | Facility: CLINIC | Age: 71
End: 2021-11-24
Payer: MEDICARE

## 2021-11-24 VITALS — TEMPERATURE: 97.9 F

## 2021-11-24 PROCEDURE — 99024 POSTOP FOLLOW-UP VISIT: CPT

## 2021-11-24 RX ORDER — OXYCODONE 5 MG/1
5 TABLET ORAL
Qty: 21 | Refills: 0 | Status: ACTIVE | COMMUNITY
Start: 2021-11-24 | End: 1900-01-01

## 2021-11-24 NOTE — ASSESSMENT
[FreeTextEntry1] : 71 year old female approximately two weeks status post right total hip replacement. Staples were removed from the incision site and Steri-Strips were applied. Pt was instructed she may shower, allowing the soap and water to run over the incision site, but not to directly scrub over the wound. Pt is to continue Aspirin 81 mg twice daily for DVT prophylaxis. Pt is to follow up in three weeks for assessment. In the interim, if she develops any fevers, erythema, drainage from the incision site, or any concerning symptoms, we will see her prior to her scheduled appointment.

## 2021-11-24 NOTE — HISTORY OF PRESENT ILLNESS
[de-identified] : 71 year old female s/p right total hip replacement presents to the office today for her 2 week follow up. She is here today for staple removal. Patient is ambulating with a walker today. She reports taking Tramadol for pain mostly, but oxycodone for any severe pain. She has continued to do physical therapy at home and will be completing this within the week. Patient states she has been taking Aspirin 81 mg twice daily for DVT prophylaxis. Patient denies any fevers, chills, drainage from the incision site, chest pain, or shortness of breath.\par

## 2021-11-24 NOTE — PHYSICAL EXAM
[de-identified] : Right Hip\par Inspection: No effusion\par Wounds: Incision is clean and dry, staples are intact\par Alignment: Normal\par Stability: No instability\par Palpation: No specific tenderness on palpation\par Muscle Test: 5/5 All motor groups\par Leg examination: Calf is soft and non-tender.

## 2021-11-29 RX ORDER — ACETAMINOPHEN 325 MG/1
325 TABLET, FILM COATED ORAL EVERY 6 HOURS
Qty: 56 | Refills: 0 | Status: ACTIVE | COMMUNITY
Start: 2021-11-29 | End: 1900-01-01

## 2021-12-22 ENCOUNTER — APPOINTMENT (OUTPATIENT)
Dept: ORTHOPEDIC SURGERY | Facility: CLINIC | Age: 71
End: 2021-12-22
Payer: MEDICARE

## 2021-12-22 PROCEDURE — 99024 POSTOP FOLLOW-UP VISIT: CPT

## 2021-12-22 PROCEDURE — 72040 X-RAY EXAM NECK SPINE 2-3 VW: CPT

## 2021-12-22 PROCEDURE — 73564 X-RAY EXAM KNEE 4 OR MORE: CPT | Mod: LT

## 2021-12-22 PROCEDURE — 73502 X-RAY EXAM HIP UNI 2-3 VIEWS: CPT | Mod: RT

## 2021-12-22 NOTE — ASSESSMENT
[FreeTextEntry1] : S/p RTH 11/11/21 , presents to the office for a 6 week visit. She has no complaints in regards to the right hip. But she is having difficulty with her posture, gait, and balance. This is multifactorial related to her arthritic lumbar spine and arthritic left knee. The severe flexion contracture of her left knee gives her an apparent LLD. We addressed this with 1/2 inch lift under the left foot which she found to be helpful. She will therefore buy a 3/8 inch insert and well reevaluate her in 3 months time.

## 2021-12-22 NOTE — HISTORY OF PRESENT ILLNESS
[de-identified] : 71 year old female s/p right total hip replacement presents to the office today for her 6 week follow up. Patient reports minimal pain in her right total hip. She reports pain in the left leg mostly. Patient denies any swelling, buckling, locking, clicking, or a loss of motion. Patient is feeling limited by her left side and continues to work on negotiating stairs. Patient states her left knee dora from underneath her. She has been ambulating with a cane. Overall, patient feels her right hip is doing well but feels the left side is limiting her.

## 2021-12-22 NOTE — PHYSICAL EXAM
[de-identified] : 15-20 degree flexion contracture of the left knee  [de-identified] : Radiographs done today AP pelvis and lateral of the right hip shows a well aligned cementless total hip with OREO insert, bone on bone in the left hip \par \par Radiographs done today AP lateral and skyline of both knees shows bone on bone in the medial compartment of the left knee, significantly narrowed medial compartment of the right knee. \par \par Radiographs done today AP and lateral of the LS spine shows severe arthritic changes of the lower lumbar spine with bridging osteophytes noted on AP and lateral views.

## 2022-02-07 ENCOUNTER — APPOINTMENT (OUTPATIENT)
Dept: ORTHOPEDIC SURGERY | Facility: CLINIC | Age: 72
End: 2022-02-07
Payer: MEDICARE

## 2022-02-07 PROCEDURE — 99024 POSTOP FOLLOW-UP VISIT: CPT

## 2022-02-07 PROCEDURE — 73502 X-RAY EXAM HIP UNI 2-3 VIEWS: CPT | Mod: RT

## 2022-02-07 NOTE — HISTORY OF PRESENT ILLNESS
[de-identified] : 71 year old female s/p right total hip replacement presents to the office today for her 3 month follow up. Patient denies any pain in her right total hip. Her biggest concern is still using a cane for ambulation. She states her difficulty with ambulation comes from not having support on her left side. Patient denies any swelling, buckling, locking, or loss of motion in the right hip. Patient reports doing well with her right hip overall but has some concerns regarding her left side.  Odomzo Counseling- I discussed with the patient the risks of Odomzo including but not limited to nausea, vomiting, diarrhea, constipation, weight loss, changes in the sense of taste, decreased appetite, muscle spasms, and hair loss.  The patient verbalized understanding of the proper use and possible adverse effects of Odomzo.  All of the patient's questions and concerns were addressed.

## 2022-02-07 NOTE — ASSESSMENT
[FreeTextEntry1] : 71 year old female s/p  RTH 11/11/21 , presents to the office for a 3 month follow up. She has no complaints in regards to the right hip. She continues having difficulty with her posture, gait, and balance. This is multifactorial related to her arthritic lumbar spine and arthritic left knee. She is wearing a shoe lift but is unsure whether this is helpful or not. Patient is doing well with her right total hip. We will see her back in 3 months time.

## 2022-02-07 NOTE — PHYSICAL EXAM
[de-identified] : Right Hip\par Inspection: No effusion\par Wounds: Healed incision about the left hip, no drainage or erythema\par Alignment: Normal\par Stability: No instability\par Palpation: No specific tenderness on palpation\par ROM: Flexion to 60 degrees, 20 degrees external rotation, 20 degrees abduction\par Muscle Test: 5/5 All motor groups\par Leg examination: Calf is soft and non-tender.\par \par 15-20 degree flexion contracture of the left knee  [de-identified] : Radiographs done today AP pelvis and lateral of the right hip shows a well aligned cementless total hip with OREO insert, bone on bone in the left hip \par

## 2022-05-11 ENCOUNTER — APPOINTMENT (OUTPATIENT)
Dept: ORTHOPEDIC SURGERY | Facility: CLINIC | Age: 72
End: 2022-05-11
Payer: MEDICARE

## 2022-05-11 PROCEDURE — 99213 OFFICE O/P EST LOW 20 MIN: CPT

## 2022-05-11 PROCEDURE — 73502 X-RAY EXAM HIP UNI 2-3 VIEWS: CPT | Mod: RT

## 2022-05-11 NOTE — HISTORY OF PRESENT ILLNESS
[de-identified] : 72 year old female s/p right total hip replacement presents to the office today for her 6 month follow up. Patient reports doing okay with in regard to her right hip, but states she is feeling limited by her left side and points to her left lower back. She reports taking Tylenol Occasionally for any pains she might have. Patient is ambulating with a cane today which she attributes to her left low back pain and left knee pain. Today, patient comes in with a shoe lift in her left shoe but still feels that she is uneven. Patient is here today to discuss her next steps but feels her right hip is doing well overall.

## 2022-05-11 NOTE — ASSESSMENT
[FreeTextEntry1] : S/p RTH 11/11/2021 presents for a 6 month f/u visit. She is doing very well with regards to the right hip. She continues to ambulate with a cane stating she feels unsteady without it. She has a severely arthritic left knee which is her CC. She has an ipsilateral arthritic hip which she is not symptomatic of at this time. She is interested in having surgery for the left knee in the fall but must talk with the daughter first. She can f/u in 6 months for her RTH.

## 2022-05-11 NOTE — PHYSICAL EXAM
[de-identified] : Right Hip\par Inspection: No effusion\par Wounds: Healed incision about the left hip, no drainage or erythema\par Alignment: Normal\par Stability: No instability\par Palpation: No specific tenderness on palpation\par ROM: Flexion to 60, ER 30, ABD 30 \par Muscle Test: 5/5 All motor groups\par Leg examination: Calf is soft and non-tender.\par \par left knee\par 15 degree flexion contracture, flexion to 90\par \par painless ROM of the left hip  [de-identified] : Radiographs done today AP pelvis and lateral of hips shows well aligned hybrid hip replacement on the right

## 2022-07-21 RX ORDER — AMOXICILLIN 500 MG/1
500 TABLET, FILM COATED ORAL
Qty: 16 | Refills: 0 | Status: ACTIVE | COMMUNITY
Start: 2022-07-21 | End: 1900-01-01

## 2022-09-21 ENCOUNTER — APPOINTMENT (OUTPATIENT)
Dept: ORTHOPEDIC SURGERY | Facility: CLINIC | Age: 72
End: 2022-09-21

## 2022-09-21 PROCEDURE — 99213 OFFICE O/P EST LOW 20 MIN: CPT

## 2022-09-21 NOTE — ASSESSMENT
[FreeTextEntry1] : Patient presents for a pre op discussion regarding left total knee replacement on 10/13/2022. She has already had a right total hip about 10 months ago so she is familiar with the procedure. I described knee replacement surgery to her and her daughter as well as the potential risks of the surgery. The daughter will be caring for her when she goes home. I also explained to the both of them that she has an ipsilateral arthritic hip. At this time it is not bothering her but in the future it will. \par \par The risks, benefits, alternatives of treatment, and aftercare precautions following joint replacement surgery were reviewed with the patient and all questions were answered. Models equivalent to the one that will be used for their total knee replacement were used, radiographs reviewed, and a brochure was given to the patient. The implant type utilized, including bearing surfaces fixation techniques were reviewed in detail, and the patient chose to proceed with elective joint replacement surgery. The patient agrees with the plan of care as well as the use of implants for their total knee replacement. The patient's body mass index was recorded in my office notes, and for those patients whose body mass index of greater than 30, I recommended that they review a weight reduction program with their internist. The importance of smoking cessation was reviewed with all patient's, and for those patients who still smoke, I recommended that they review a smoking cessation program with their internist.\par \par \par \par

## 2022-09-21 NOTE — PHYSICAL EXAM
[de-identified] : Left knee\par Inspection: no effusion or erythema.\par Wounds: none.\par Alignment: normal.\par Palpation: no specific tenderness on palpation.\par ROM: 15 degree flexion contracture, flexion to 90 degrees\par Ligamentous laxity: all ligaments appear \par Meniscal Test: negative McMurrays\par Muscle Test: good quad strength.\par Leg examination: calf is soft and non-tender.\par \par Painless ROM of the left hip\par \par  [de-identified] : Radiographs done 12/22/2021 AP lateral and skyline of both knees shows bone on bone in the medial compartment of the left knee\par

## 2022-09-27 ENCOUNTER — RESULT REVIEW (OUTPATIENT)
Age: 72
End: 2022-09-27

## 2022-09-27 ENCOUNTER — OUTPATIENT (OUTPATIENT)
Dept: OUTPATIENT SERVICES | Facility: HOSPITAL | Age: 72
LOS: 1 days | Discharge: HOME | End: 2022-09-27

## 2022-09-27 VITALS
DIASTOLIC BLOOD PRESSURE: 67 MMHG | TEMPERATURE: 97 F | RESPIRATION RATE: 16 BRPM | HEART RATE: 60 BPM | WEIGHT: 170.86 LBS | HEIGHT: 67 IN | OXYGEN SATURATION: 100 % | SYSTOLIC BLOOD PRESSURE: 144 MMHG

## 2022-09-27 DIAGNOSIS — Z98.890 OTHER SPECIFIED POSTPROCEDURAL STATES: Chronic | ICD-10-CM

## 2022-09-27 DIAGNOSIS — Z01.818 ENCOUNTER FOR OTHER PREPROCEDURAL EXAMINATION: ICD-10-CM

## 2022-09-27 DIAGNOSIS — M17.12 UNILATERAL PRIMARY OSTEOARTHRITIS, LEFT KNEE: ICD-10-CM

## 2022-09-27 DIAGNOSIS — Z95.828 PRESENCE OF OTHER VASCULAR IMPLANTS AND GRAFTS: Chronic | ICD-10-CM

## 2022-09-27 LAB
A1C WITH ESTIMATED AVERAGE GLUCOSE RESULT: 5.2 % — SIGNIFICANT CHANGE UP (ref 4–5.6)
ALBUMIN SERPL ELPH-MCNC: 4.4 G/DL — SIGNIFICANT CHANGE UP (ref 3.5–5.2)
ALP SERPL-CCNC: 124 U/L — HIGH (ref 30–115)
ALT FLD-CCNC: 21 U/L — SIGNIFICANT CHANGE UP (ref 0–41)
ANION GAP SERPL CALC-SCNC: 13 MMOL/L — SIGNIFICANT CHANGE UP (ref 7–14)
APTT BLD: 30.2 SEC — SIGNIFICANT CHANGE UP (ref 27–39.2)
AST SERPL-CCNC: 24 U/L — SIGNIFICANT CHANGE UP (ref 0–41)
BASOPHILS # BLD AUTO: 0.04 K/UL — SIGNIFICANT CHANGE UP (ref 0–0.2)
BASOPHILS NFR BLD AUTO: 0.8 % — SIGNIFICANT CHANGE UP (ref 0–1)
BILIRUB SERPL-MCNC: 0.5 MG/DL — SIGNIFICANT CHANGE UP (ref 0.2–1.2)
BLD GP AB SCN SERPL QL: SIGNIFICANT CHANGE UP
BUN SERPL-MCNC: 17 MG/DL — SIGNIFICANT CHANGE UP (ref 10–20)
CALCIUM SERPL-MCNC: 9.4 MG/DL — SIGNIFICANT CHANGE UP (ref 8.4–10.5)
CHLORIDE SERPL-SCNC: 105 MMOL/L — SIGNIFICANT CHANGE UP (ref 98–110)
CO2 SERPL-SCNC: 26 MMOL/L — SIGNIFICANT CHANGE UP (ref 17–32)
CREAT SERPL-MCNC: 0.6 MG/DL — LOW (ref 0.7–1.5)
EGFR: 94 ML/MIN/1.73M2 — SIGNIFICANT CHANGE UP
EOSINOPHIL # BLD AUTO: 0.19 K/UL — SIGNIFICANT CHANGE UP (ref 0–0.7)
EOSINOPHIL NFR BLD AUTO: 3.6 % — SIGNIFICANT CHANGE UP (ref 0–8)
ESTIMATED AVERAGE GLUCOSE: 103 MG/DL — SIGNIFICANT CHANGE UP (ref 68–114)
GLUCOSE SERPL-MCNC: 70 MG/DL — SIGNIFICANT CHANGE UP (ref 70–99)
HCT VFR BLD CALC: 39.6 % — SIGNIFICANT CHANGE UP (ref 37–47)
HGB BLD-MCNC: 13.3 G/DL — SIGNIFICANT CHANGE UP (ref 12–16)
IMM GRANULOCYTES NFR BLD AUTO: 0.2 % — SIGNIFICANT CHANGE UP (ref 0.1–0.3)
INR BLD: 0.99 RATIO — SIGNIFICANT CHANGE UP (ref 0.65–1.3)
LYMPHOCYTES # BLD AUTO: 1.63 K/UL — SIGNIFICANT CHANGE UP (ref 1.2–3.4)
LYMPHOCYTES # BLD AUTO: 30.9 % — SIGNIFICANT CHANGE UP (ref 20.5–51.1)
MCHC RBC-ENTMCNC: 30.4 PG — SIGNIFICANT CHANGE UP (ref 27–31)
MCHC RBC-ENTMCNC: 33.6 G/DL — SIGNIFICANT CHANGE UP (ref 32–37)
MCV RBC AUTO: 90.4 FL — SIGNIFICANT CHANGE UP (ref 81–99)
MONOCYTES # BLD AUTO: 0.47 K/UL — SIGNIFICANT CHANGE UP (ref 0.1–0.6)
MONOCYTES NFR BLD AUTO: 8.9 % — SIGNIFICANT CHANGE UP (ref 1.7–9.3)
MRSA PCR RESULT.: NEGATIVE — SIGNIFICANT CHANGE UP
NEUTROPHILS # BLD AUTO: 2.93 K/UL — SIGNIFICANT CHANGE UP (ref 1.4–6.5)
NEUTROPHILS NFR BLD AUTO: 55.6 % — SIGNIFICANT CHANGE UP (ref 42.2–75.2)
NRBC # BLD: 0 /100 WBCS — SIGNIFICANT CHANGE UP (ref 0–0)
PLATELET # BLD AUTO: 233 K/UL — SIGNIFICANT CHANGE UP (ref 130–400)
POTASSIUM SERPL-MCNC: 4.3 MMOL/L — SIGNIFICANT CHANGE UP (ref 3.5–5)
POTASSIUM SERPL-SCNC: 4.3 MMOL/L — SIGNIFICANT CHANGE UP (ref 3.5–5)
PROT SERPL-MCNC: 6.5 G/DL — SIGNIFICANT CHANGE UP (ref 6–8)
PROTHROM AB SERPL-ACNC: 11.4 SEC — SIGNIFICANT CHANGE UP (ref 9.95–12.87)
RBC # BLD: 4.38 M/UL — SIGNIFICANT CHANGE UP (ref 4.2–5.4)
RBC # FLD: 12.8 % — SIGNIFICANT CHANGE UP (ref 11.5–14.5)
SODIUM SERPL-SCNC: 144 MMOL/L — SIGNIFICANT CHANGE UP (ref 135–146)
WBC # BLD: 5.27 K/UL — SIGNIFICANT CHANGE UP (ref 4.8–10.8)
WBC # FLD AUTO: 5.27 K/UL — SIGNIFICANT CHANGE UP (ref 4.8–10.8)

## 2022-09-27 PROCEDURE — 93010 ELECTROCARDIOGRAM REPORT: CPT

## 2022-09-27 PROCEDURE — 71046 X-RAY EXAM CHEST 2 VIEWS: CPT | Mod: 26

## 2022-09-27 PROCEDURE — 72170 X-RAY EXAM OF PELVIS: CPT | Mod: 26

## 2022-09-27 PROCEDURE — 73562 X-RAY EXAM OF KNEE 3: CPT | Mod: 26,LT

## 2022-09-27 NOTE — H&P PST ADULT - NSICDXPASTSURGICALHX_GEN_ALL_CORE_FT
PAST SURGICAL HISTORY:  H/O ovarian cystectomy     Port-A-Cath in place 10 yrs    S/P lumpectomy of breast

## 2022-09-27 NOTE — H&P PST ADULT - NSICDXFAMILYHX_GEN_ALL_CORE_FT
FAMILY HISTORY:  Father  Still living? No  FH: COPD (chronic obstructive pulmonary disease), Age at diagnosis: Age Unknown

## 2022-09-27 NOTE — H&P PST ADULT - NSICDXPASTMEDICALHX_GEN_ALL_CORE_FT
PAST MEDICAL HISTORY:  Breast CA Right    Ex-cigarette smoker Teenager    GERD (gastroesophageal reflux disease)     History of chemotherapy Last 1 0yrs ago     PAST MEDICAL HISTORY:  Breast CA Right    Ex-cigarette smoker As a Teenager    GERD (gastroesophageal reflux disease)     History of chemotherapy Last 1 0yrs ago    Mild obesity BMI 26.8

## 2022-09-27 NOTE — H&P PST ADULT - REASON FOR ADMISSION
Case Type: OP  Suite: [facility]  Proceduralist: Breezy Anderson  Confirmed Surgery Date Time: 10-   PAST Date Time: 09- - 7:15  Procedure: LEFT TOTAL KNEE REPLACEMENT  Laterality: Left  Length of Procedure: 180 Minutes  Anesthesia Type: Regional  Covid Testing 10/10/2022 10:00

## 2022-09-27 NOTE — H&P PST ADULT - HISTORY OF PRESENT ILLNESS
Salo Clinton is a 71 yo female who presents to pretesting for the above surgey due to progressively worsening left knee pains with limited ROM and deforming. Patient c/o pain scale of 5/10 and parial reliefs with position change.   Patient denies any cp, sob, palpitations, fever, cough, URI, abdominal pains, N/V, UTI, Rashes or open wounds.  As per patient exercise tolerance of 1 fos walks with out sob except knee pains.  Patient denies any s/s covid 19 and reports no contact with known positive people. Patient has appointment for repeat covid testing pre op and instructed to continue to self monitor and report any concerns to MD. Pt will continue to practice self isolation and  exposure control measures pre op.  Anesthesia Alert  NO--Difficult Airway  NO--History of neck surgery or radiation  NO--Limited ROM of neck  NO--History of Malignant hyperthermia  NO--Personal or family history of Pseudocholinesterase deficiency  NO--Prior Anesthesia Complication  NO--Latex Allergy  NO--Loose teeth  NO--History of Rheumatoid Arthritis  NO--FIORDALIZA  NO Risk of Bleedings   Pt instructed to stop vitamins/supplements/herbal medications for one week prior to surgery  As per patient this is the complete medical, surgical history and medications.             Salo Clinton is a 71 yo female who presents to pretesting for the above surgery due to progressively worsening left knee pains with limited ROM and deforming x 8 months. Patient c/o pain scale of 5/10 and partial reliefs with Tylenol and position changes.   Patient denies any cp, sob, palpitations, fever, cough, URI, abdominal pains, N/V, UTI, Rashes or open wounds.  As per patient exercise tolerance of 1 fos walks with out sob except left knee pains.  Patient denies any s/s covid 19 and reports no contact with known positive people. Patient has appointment for repeat covid testing pre op and instructed to continue to self monitor and report any concerns to MD. Pt will continue to practice self isolation and  exposure control measures pre op.  Anesthesia Alert  NO--Difficult Airway  NO--History of neck surgery or radiation  NO--Limited ROM of neck  NO--History of Malignant hyperthermia  NO--Personal or family history of Pseudocholinesterase deficiency  NO--Prior Anesthesia Complication  NO--Latex Allergy  NO--Loose teeth. Front Top 5 Teeth capped   NO--History of Rheumatoid Arthritis  NO--FIORDALIZA  NO Risk of Bleedings   Pt instructed to stop vitamins/supplements/herbal medications for one week prior to surgery  As per patient this is the complete medical, surgical history and medications.  Right THR 1 yr ago and Tolerated anesthesia with out any complications.              Salo Clinton is a 73 yo female who presents to pretesting for the above surgery due to progressively worsening left knee pains with limited ROM and deforming x 8 months. Patient c/o pain scale of 5/10 and partial reliefs with Tylenol and position changes.   Patient denies any cp, sob, palpitations, fever, cough, URI, abdominal pains, N/V, UTI, Rashes or open wounds.  As per patient exercise tolerance of 1 fos walks with out sob except left knee pains.  Patient denies any s/s covid 19 and reports no contact with known positive people. Patient has appointment for repeat covid testing pre op and instructed to continue to self monitor and report any concerns to MD. Pt will continue to practice self isolation and  exposure control measures pre op.  Anesthesia Alert  NO--Difficult Airway  NO--History of neck surgery or radiation  NO--Limited ROM of neck  NO--History of Malignant hyperthermia  NO--Personal or family history of Pseudocholinesterase deficiency  NO--Prior Anesthesia Complication. Delayed Awakening   NO--Latex Allergy  NO--Loose teeth. Front Top 5 Teeth capped   NO--History of Rheumatoid Arthritis  NO--FIORDALIZA  NO Risk of Bleedings   Pt instructed to stop vitamins/supplements/herbal medications for one week prior to surgery  As per patient this is the complete medical, surgical history and medications.  Right THR 1 yr ago and Tolerated anesthesia with out any complications.

## 2022-09-27 NOTE — H&P PST ADULT - NEUROLOGICAL
normal/cranial nerves II-XII intact/sensation intact normal/cranial nerves II-XII intact/sensation intact/responds to pain/responds to verbal commands

## 2022-09-27 NOTE — H&P PST ADULT - VENOUS THROMBOEMBOLISM BMI
[facility] COVID-19 Questionnaire    1. Has the patient been in close contact with a person known to have a positive test for COVID-19? No    2. Has the patient had any travel outside of the state of [place] or [place] in the last 14 days, if so, where?  No    3. When was the onset of the patient's symptoms?  3-4 days ago    4. Does the patient have a chronic health condition for which they are being actively    treated for (e.g., Asthma, COPD, Heart Disease, Diabetes, Cancer, Pregnancy) Yes Asthma, High Blood Pressure.    Current Patient Symptoms:  Per patient has an albuterol and has been using 4-6 times per day in the past 3 days, feels progressively getting worse.  Symptom Response   Fever > 100.4F?   Yes   Headache?  Yes   Runny Nose?   Yes   Sneezing?   Yes   Sore Throat?  Yes   Myalgias?   Yes   Fatigue?   Yes   Cough?   Yes   If cough is present, is it dry?   Yes   Dyspnea?  Yes   Evidence of Respiratory Distress?  Yes     Plan of Care:    • Based on this questionnaire, is it the provider's recommendation that this patient be urgently tested for COVID-19?  Yes    • Was the patient referred to the nearest Emergency Room (if so, provider must contact ER directly to notify of patient arrival)? Yes    • Was the patient recommended to abide by a 14 day quarantine by the provider?  patient was sent to the ER Call ER before entering, wear a mask.    • Was OTC symptomatic control discussed with the patient, to include, but not limited to:  Acetaminophen, Pseudoephedrine, Guaifenesin, etc.?  Advised patient to avoid ibuprofen and due to SOB while on the phone and all above symtpoms advised to go to ER for further evaluation Patient declined ambulance.    Diagnosis  There are no diagnoses linked to this encounter.     Patient was advised of current guidelines for testing as well as treatment plan for managing current symptoms.  Also advised to increase to liberal PO fluid intake plus utilization of home  humidification.  Reinforced covering cough and good hand washing techniques.  Isolation precautions reviewed, as necessary.  Patient had no further questions by the end of the visit.    Time spent in direct consultation with the patient was 5-10 minutes occurring by telephone.        DISQUALIFIED E-VISIT PROGRESS NOTE    HISTORY    The patient is a 51 year old male who is requesting an asynchronous E-Visit for evaluation of COVID 19 questions/symptoms .    The patient's responses to the questions contained in the condition-specific questionnaire submission were reviewed.    MEDICATIONS  The medication list in the medical record as well as updates to the medication list submitted by the patient with this E-Visit were reviewed.      ALLERGIES  Allergies as of 03/27/2020 - Reviewed 02/11/2020   Allergen Reaction Noted   • Cilantro   (food) HEADACHES 02/04/2020   • Sulfa antibiotics Other (See Comments) 10/22/2008       HISTORIES  I have personally reviewed and updated the following electronic medical record sections: Current medications and Past Medical History     ASSESSMENT/PLAN  Upon review of the patient's responses to the questionnaire and a review of the medical record, it is my opinion that a face-to-face medical evaluation of this complaint is required. I recommend the following venue of care - emergency department    The patient will be contacted by the Contact Center team member to notify the patient of my disposition recommendation.   (1) obesity (BMI greater than 25)

## 2022-10-05 PROBLEM — K21.9 GASTRO-ESOPHAGEAL REFLUX DISEASE WITHOUT ESOPHAGITIS: Chronic | Status: ACTIVE | Noted: 2022-09-27

## 2022-10-05 PROBLEM — Z92.21 PERSONAL HISTORY OF ANTINEOPLASTIC CHEMOTHERAPY: Chronic | Status: ACTIVE | Noted: 2022-09-27

## 2022-10-05 PROBLEM — Z87.891 PERSONAL HISTORY OF NICOTINE DEPENDENCE: Chronic | Status: ACTIVE | Noted: 2022-09-27

## 2022-10-05 PROBLEM — C50.919 MALIGNANT NEOPLASM OF UNSPECIFIED SITE OF UNSPECIFIED FEMALE BREAST: Chronic | Status: ACTIVE | Noted: 2021-11-02

## 2022-10-05 PROBLEM — E66.9 OBESITY, UNSPECIFIED: Chronic | Status: ACTIVE | Noted: 2022-09-27

## 2022-10-10 ENCOUNTER — LABORATORY RESULT (OUTPATIENT)
Age: 72
End: 2022-10-10

## 2022-10-13 ENCOUNTER — APPOINTMENT (OUTPATIENT)
Dept: ORTHOPEDIC SURGERY | Facility: HOSPITAL | Age: 72
End: 2022-10-13

## 2022-10-13 ENCOUNTER — INPATIENT (INPATIENT)
Facility: HOSPITAL | Age: 72
LOS: 0 days | Discharge: HOME | End: 2022-10-14
Attending: ORTHOPAEDIC SURGERY | Admitting: ORTHOPAEDIC SURGERY

## 2022-10-13 ENCOUNTER — TRANSCRIPTION ENCOUNTER (OUTPATIENT)
Age: 72
End: 2022-10-13

## 2022-10-13 ENCOUNTER — RESULT REVIEW (OUTPATIENT)
Age: 72
End: 2022-10-13

## 2022-10-13 VITALS
OXYGEN SATURATION: 98 % | RESPIRATION RATE: 17 BRPM | TEMPERATURE: 98 F | DIASTOLIC BLOOD PRESSURE: 83 MMHG | HEIGHT: 67 IN | SYSTOLIC BLOOD PRESSURE: 179 MMHG | HEART RATE: 61 BPM | WEIGHT: 160.06 LBS

## 2022-10-13 DIAGNOSIS — Z98.890 OTHER SPECIFIED POSTPROCEDURAL STATES: Chronic | ICD-10-CM

## 2022-10-13 DIAGNOSIS — Z95.828 PRESENCE OF OTHER VASCULAR IMPLANTS AND GRAFTS: Chronic | ICD-10-CM

## 2022-10-13 LAB
GLUCOSE BLDC GLUCOMTR-MCNC: 121 MG/DL — HIGH (ref 70–99)
GLUCOSE BLDC GLUCOMTR-MCNC: 85 MG/DL — SIGNIFICANT CHANGE UP (ref 70–99)

## 2022-10-13 PROCEDURE — 27447 TOTAL KNEE ARTHROPLASTY: CPT | Mod: LT

## 2022-10-13 PROCEDURE — 73560 X-RAY EXAM OF KNEE 1 OR 2: CPT | Mod: 26,LT

## 2022-10-13 PROCEDURE — 88311 DECALCIFY TISSUE: CPT | Mod: 26

## 2022-10-13 PROCEDURE — 88305 TISSUE EXAM BY PATHOLOGIST: CPT | Mod: 26

## 2022-10-13 RX ORDER — TRAMADOL HYDROCHLORIDE 50 MG/1
50 TABLET ORAL EVERY 4 HOURS
Refills: 0 | Status: DISCONTINUED | OUTPATIENT
Start: 2022-10-13 | End: 2022-10-14

## 2022-10-13 RX ORDER — SODIUM CHLORIDE 9 MG/ML
1000 INJECTION, SOLUTION INTRAVENOUS
Refills: 0 | Status: DISCONTINUED | OUTPATIENT
Start: 2022-10-13 | End: 2022-10-13

## 2022-10-13 RX ORDER — PANTOPRAZOLE SODIUM 20 MG/1
40 TABLET, DELAYED RELEASE ORAL
Refills: 0 | Status: DISCONTINUED | OUTPATIENT
Start: 2022-10-13 | End: 2022-10-14

## 2022-10-13 RX ORDER — SENNA PLUS 8.6 MG/1
2 TABLET ORAL AT BEDTIME
Refills: 0 | Status: DISCONTINUED | OUTPATIENT
Start: 2022-10-13 | End: 2022-10-14

## 2022-10-13 RX ORDER — CHLORHEXIDINE GLUCONATE 213 G/1000ML
1 SOLUTION TOPICAL
Refills: 0 | Status: DISCONTINUED | OUTPATIENT
Start: 2022-10-13 | End: 2022-10-14

## 2022-10-13 RX ORDER — CELECOXIB 200 MG/1
200 CAPSULE ORAL ONCE
Refills: 0 | Status: COMPLETED | OUTPATIENT
Start: 2022-10-13 | End: 2022-10-13

## 2022-10-13 RX ORDER — ASPIRIN/CALCIUM CARB/MAGNESIUM 324 MG
81 TABLET ORAL EVERY 12 HOURS
Refills: 0 | Status: DISCONTINUED | OUTPATIENT
Start: 2022-10-13 | End: 2022-10-14

## 2022-10-13 RX ORDER — SODIUM CHLORIDE 9 MG/ML
1000 INJECTION INTRAMUSCULAR; INTRAVENOUS; SUBCUTANEOUS
Refills: 0 | Status: DISCONTINUED | OUTPATIENT
Start: 2022-10-13 | End: 2022-10-14

## 2022-10-13 RX ORDER — POLYETHYLENE GLYCOL 3350 17 G/17G
17 POWDER, FOR SOLUTION ORAL AT BEDTIME
Refills: 0 | Status: DISCONTINUED | OUTPATIENT
Start: 2022-10-13 | End: 2022-10-14

## 2022-10-13 RX ORDER — MAGNESIUM HYDROXIDE 400 MG/1
30 TABLET, CHEWABLE ORAL DAILY
Refills: 0 | Status: DISCONTINUED | OUTPATIENT
Start: 2022-10-13 | End: 2022-10-14

## 2022-10-13 RX ORDER — ONDANSETRON 8 MG/1
4 TABLET, FILM COATED ORAL EVERY 6 HOURS
Refills: 0 | Status: DISCONTINUED | OUTPATIENT
Start: 2022-10-13 | End: 2022-10-14

## 2022-10-13 RX ORDER — DEXAMETHASONE 0.5 MG/5ML
2 ELIXIR ORAL ONCE
Refills: 0 | Status: COMPLETED | OUTPATIENT
Start: 2022-10-14 | End: 2022-10-14

## 2022-10-13 RX ORDER — ONDANSETRON 8 MG/1
4 TABLET, FILM COATED ORAL ONCE
Refills: 0 | Status: DISCONTINUED | OUTPATIENT
Start: 2022-10-13 | End: 2022-10-13

## 2022-10-13 RX ORDER — CEFAZOLIN SODIUM 1 G
2000 VIAL (EA) INJECTION EVERY 8 HOURS
Refills: 0 | Status: COMPLETED | OUTPATIENT
Start: 2022-10-13 | End: 2022-10-14

## 2022-10-13 RX ORDER — HYDROMORPHONE HYDROCHLORIDE 2 MG/ML
0.5 INJECTION INTRAMUSCULAR; INTRAVENOUS; SUBCUTANEOUS
Refills: 0 | Status: DISCONTINUED | OUTPATIENT
Start: 2022-10-13 | End: 2022-10-13

## 2022-10-13 RX ORDER — CELECOXIB 200 MG/1
200 CAPSULE ORAL EVERY 12 HOURS
Refills: 0 | Status: DISCONTINUED | OUTPATIENT
Start: 2022-10-14 | End: 2022-10-14

## 2022-10-13 RX ORDER — ACETAMINOPHEN 500 MG
650 TABLET ORAL EVERY 6 HOURS
Refills: 0 | Status: DISCONTINUED | OUTPATIENT
Start: 2022-10-13 | End: 2022-10-14

## 2022-10-13 RX ORDER — INFLUENZA VIRUS VACCINE 15; 15; 15; 15 UG/.5ML; UG/.5ML; UG/.5ML; UG/.5ML
0.7 SUSPENSION INTRAMUSCULAR ONCE
Refills: 0 | Status: DISCONTINUED | OUTPATIENT
Start: 2022-10-13 | End: 2022-10-14

## 2022-10-13 RX ORDER — OXYCODONE HYDROCHLORIDE 5 MG/1
5 TABLET ORAL EVERY 4 HOURS
Refills: 0 | Status: DISCONTINUED | OUTPATIENT
Start: 2022-10-13 | End: 2022-10-14

## 2022-10-13 RX ORDER — KETOROLAC TROMETHAMINE 30 MG/ML
15 SYRINGE (ML) INJECTION EVERY 6 HOURS
Refills: 0 | Status: COMPLETED | OUTPATIENT
Start: 2022-10-13 | End: 2022-10-14

## 2022-10-13 RX ADMIN — POLYETHYLENE GLYCOL 3350 17 GRAM(S): 17 POWDER, FOR SOLUTION ORAL at 23:17

## 2022-10-13 RX ADMIN — OXYCODONE HYDROCHLORIDE 5 MILLIGRAM(S): 5 TABLET ORAL at 23:34

## 2022-10-13 RX ADMIN — CELECOXIB 200 MILLIGRAM(S): 200 CAPSULE ORAL at 17:00

## 2022-10-13 RX ADMIN — SENNA PLUS 2 TABLET(S): 8.6 TABLET ORAL at 23:16

## 2022-10-13 RX ADMIN — TRAMADOL HYDROCHLORIDE 50 MILLIGRAM(S): 50 TABLET ORAL at 20:00

## 2022-10-13 RX ADMIN — Medication 15 MILLIGRAM(S): at 23:18

## 2022-10-13 RX ADMIN — Medication 650 MILLIGRAM(S): at 23:15

## 2022-10-13 RX ADMIN — SODIUM CHLORIDE 100 MILLILITER(S): 9 INJECTION INTRAMUSCULAR; INTRAVENOUS; SUBCUTANEOUS at 20:48

## 2022-10-13 RX ADMIN — Medication 81 MILLIGRAM(S): at 23:16

## 2022-10-13 RX ADMIN — Medication 650 MILLIGRAM(S): at 17:46

## 2022-10-13 RX ADMIN — CELECOXIB 200 MILLIGRAM(S): 200 CAPSULE ORAL at 10:59

## 2022-10-13 RX ADMIN — Medication 100 MILLIGRAM(S): at 20:49

## 2022-10-13 NOTE — DISCHARGE NOTE PROVIDER - HOSPITAL COURSE
on 10/13/2022 patient underwent a left total knee replacement with dr salcedo.  Patient tolerated the procedure well with no complications.  Antibiotic prophylaxis with ancef for 24 hours.  Blood clot prevention with aspirin 81mg every 12 hours for 35 days post op.  Post op patient participated with physical therapy and did well.  Was cleared for discharge home with services.  Follow up with dr salcedo as a out patient on 10/13/2022 patient underwent a left total knee replacement with dr Anderson.  Patient tolerated the procedure well with no complications.  Antibiotic prophylaxis with ancef for 24 hours.  Blood clot prevention with aspirin 81mg every 12 hours for 35 days post op.  Post op patient participated with physical therapy and did well.  Was cleared for discharge home with services.  Pt was evaluated by urology for hematuria symptoms, rec OP CT urogram and cystoscopy. Follow up with dr Anderson as a out patient in 2 weeks, f/u urology dr Uribe in 1 week. on 10/13/2022 patient underwent a left total knee replacement with dr Anderson.  Patient tolerated the procedure well with no complications.  Antibiotic prophylaxis with ancef for 24 hours.  Blood clot prevention with aspirin 81mg every 12 hours for 35 days post op.  Post op patient participated with physical therapy and did well.  Was cleared for discharge home with services.  Pt was evaluated by urology for hematuria symptoms, rec OP CT urogram and cystoscopy. Pt will be discharged home on cefpodoxime 100 mg bid x 7 days for UTI, will f/u with PCP dr Amaro next week. Follow up with dr Anderson as a out patient in 2 weeks, f/u urology dr Uribe in 1 week.

## 2022-10-13 NOTE — DISCHARGE NOTE PROVIDER - PROVIDER TOKENS
PROVIDER:[TOKEN:[[id number]]] PROVIDER:[TOKEN:[[id number]]],PROVIDER:[TOKEN:[[id number]]],PROVIDER:[TOKEN:[[id number]]]

## 2022-10-13 NOTE — PHYSICAL THERAPY INITIAL EVALUATION ADULT - GENERAL OBSERVATIONS, REHAB EVAL
19:35-20:00. Chart reviewed; confirmed with RN to see the pt for PT. Pt ready for PT; received in chair with complain of pain in L LE. +hep lock L UE, +ace bandage L knee. Agreeable for PT evaluation.

## 2022-10-13 NOTE — DISCHARGE NOTE PROVIDER - NSDCFUADDINST_GEN_ALL_CORE_FT
REMOVE AQUACELL DRESSING 1 WEEK AFTER SURGERY.  OK TO SHOWER, DO NOT SATURATE, PAT DRY.  ONCE REMOVED DO NOT APPLY ANY LOTIONS OR CREAMS TO INCISION.  iF INCREASED PAIN FEVER SWELLING OR DISCHARGE/BLEEDING CALL MD OFFICE.  STAPLE REMOVAL IN OFFICE 2 WEEKS POST OP.  CALL FOR APPOINTMENT.  PLEASE TAKE ASPIRIN 81MG EVERY 12 HOURS FOR THE NEXT 35 DAYS FOR BLOOD CLOT PREVENTION.  ICE TO KNEE PLACE OVER A TOWEL, 20 MINUTES ON 20 MINUTES OFF.  PAIN MEDS AS PRESCRIBED.  CAN TAKE OVER THE COUNTER TYLENOL.  PROTONIX FOR HEARTBURN PREVENTION.  WHILE ON CELEBREX DO NOT TAKE ANY OTHER NSAIDS example ( MOTRIN /ALEVE /IBUPROFEN / NAPROXEN) SENNA STOOL SOFTENER AND NEEDED.  WEIGHT BEARING AS TOLERATED WITH A WALKER.  CALL OFFICE FOR APPOINTMENT  REMOVE AQUACELL DRESSING 1 WEEK AFTER SURGERY.  OK TO SHOWER, DO NOT SATURATE, PAT DRY.  ONCE REMOVED DO NOT APPLY ANY LOTIONS OR CREAMS TO INCISION.  iF INCREASED PAIN FEVER SWELLING OR DISCHARGE/BLEEDING CALL MD OFFICE.  STAPLE REMOVAL IN OFFICE 2 WEEKS POST OP.  CALL FOR APPOINTMENT.  PLEASE TAKE ASPIRIN 81MG EVERY 12 HOURS FOR THE NEXT 35 DAYS FOR BLOOD CLOT PREVENTION.  ICE TO KNEE PLACE OVER A TOWEL, 20 MINUTES ON 20 MINUTES OFF.  PAIN MEDS AS PRESCRIBED.  CAN TAKE OVER THE COUNTER TYLENOL.  PROTONIX FOR HEARTBURN PREVENTION.   SENNA STOOL SOFTENER AND NEEDED.  WEIGHT BEARING AS TOLERATED WITH A WALKER.  CALL OFFICE FOR APPOINTMENT

## 2022-10-13 NOTE — PATIENT PROFILE ADULT - FALL HARM RISK - HARM RISK INTERVENTIONS
Assistance with ambulation/Assistance OOB with selected safe patient handling equipment/Communicate Risk of Fall with Harm to all staff/Discuss with provider need for PT consult/Monitor gait and stability/Provide patient with walking aids - walker, cane, crutches/Reinforce activity limits and safety measures with patient and family/Sit up slowly, dangle for a short time, stand at bedside before walking/Tailored Fall Risk Interventions/Use of alarms - bed, chair and/or voice tab/Visual Cue: Yellow wristband and red socks/Bed in lowest position, wheels locked, appropriate side rails in place/Call bell, personal items and telephone in reach/Instruct patient to call for assistance before getting out of bed or chair/Non-slip footwear when patient is out of bed/[place] to call system/Physically safe environment - no spills, clutter or unnecessary equipment/Purposeful Proactive Rounding/Room/bathroom lighting operational, light cord in reach

## 2022-10-13 NOTE — DISCHARGE NOTE PROVIDER - NSDCCPGOAL_GEN_ALL_CORE_FT
To get better and follow your care plan as instructed. To get better and follow your care plan as instructed. PT, WBAT, TAKE ABX CEFPODOXIME 100MG BID X 7 DAYS FOR URINARY TRACT INFECTION, f/u with PCP next week, f/u with urology dr Uribe in 1 week

## 2022-10-13 NOTE — DISCHARGE NOTE PROVIDER - CARE PROVIDER_API CALL
Breezy Anderson Pete  Orthopedic Surgery  [address] [place] [id number]  Phone: [phone]  Fax: [phone]  Follow Up Time:    Breezy Anderson  Orthopedic Surgery  [address] [place] [address]  Phone: [phone]  Fax: [phone]  Follow Up Time:     Tavo Uribe)  Urology  [address] [place] [address]  Phone: [phone]  Fax: [phone]  Follow Up Time:     PHIL ALEJANDRA  Internal Medicine  [address] [place] [address]  Phone: [phone]  Fax: [phone]  Follow Up Time:

## 2022-10-13 NOTE — ASU PATIENT PROFILE, ADULT - FALL HARM RISK - PATIENT NEEDS ASSISTANCE
no loss of consciousness, no gait abnormality, no headache, no sensory deficits, and no weakness.
No assistance needed

## 2022-10-13 NOTE — ASU PATIENT PROFILE, ADULT - FALL HARM RISK - RISK INTERVENTIONS

## 2022-10-13 NOTE — ASU PATIENT PROFILE, ADULT - NSICDXPASTMEDICALHX_GEN_ALL_CORE_FT
PAST MEDICAL HISTORY:  Breast CA Right    Ex-cigarette smoker As a Teenager    GERD (gastroesophageal reflux disease)     History of chemotherapy Last 1 0yrs ago    Mild obesity BMI 26.8

## 2022-10-13 NOTE — CHART NOTE - NSCHARTNOTEFT_GEN_A_CORE
PACU ANESTHESIA ADMISSION NOTE      Procedure: Left total knee arthroplasty      Post op diagnosis:  Arthritis of left knee        ____  Intubated  TV:______       Rate: ______      FiO2: ______    _x___  Patent Airway    _x___  Full return of protective reflexes    _x___  Full recovery from anesthesia / back to baseline status    Vitals:  T(C): 3  HR: 66  BP: 105/55  RR: 17   SpO2: 98%    Mental Status:  _x___ Awake   _____ Alert   _____ Drowsy   _____ Sedated    Nausea/Vomiting:  _x___  NO       ______Yes,   See Post - Op Orders         Pain Scale (0-10):  __0___    Treatment: _x___ None    ____ See Post - Op/PCA Orders    Post - Operative Fluids:   __x__ Oral   ____ See Post - Op Orders    Plan: Discharge:   ____Home       ___X__Floor     _____Critical Care    _____  Other:_________________    Comments:  No anesthesia issues or complications noted.  Discharge when criteria met.

## 2022-10-13 NOTE — DISCHARGE NOTE PROVIDER - NSDCMRMEDTOKEN_GEN_ALL_CORE_FT
acetaminophen 325 mg oral tablet: 2 tab(s) orally every 4 hours, As Needed   acetaminophen 325 mg oral tablet: 2 tab(s) orally every 6 hours  aspirin 81 mg oral delayed release tablet: 1 tab(s) orally every 12 hours MDD:2  cefpodoxime 100 mg oral tablet: 1 tab(s) orally 2 times a day MDD:2  oxyCODONE 5 mg oral tablet: 1 tab(s) orally every 8 hours, As Needed -breakthrough pain MDD:3  pantoprazole 40 mg oral delayed release tablet: 1 tab(s) orally once a day (before a meal) MDD:1  senna leaf extract oral tablet: 2 tab(s) orally once a day (at bedtime), As Needed -for constipation MDD:2   traMADol 50 mg oral tablet: 1 tab(s) orally every 6 hours, As Needed -Moderate Pain (4 - 6) - for severe pain MDD:4

## 2022-10-13 NOTE — DISCHARGE NOTE PROVIDER - CARE PROVIDERS DIRECT ADDRESSES
,[email or web address],DirectAddress_Unknown,DirectAddress_Unknown

## 2022-10-13 NOTE — DISCHARGE NOTE PROVIDER - NSDCCPCAREPLAN_GEN_ALL_CORE_FT
PRINCIPAL DISCHARGE DIAGNOSIS  Diagnosis: S/P total knee arthroplasty, left  Assessment and Plan of Treatment: REMOVE AQUACELL DRESSING 1 WEEK AFTER SURGERY.  OK TO SHOWER, DO NOT SATURATE, PAT DRY.  ONCE REMOVED DO NOT APPLY ANY LOTIONS OR CREAMS TO INCISION.  iF INCREASED PAIN FEVER SWELLING OR DISCHARGE/BLEEDING CALL MD OFFICE.  STAPLE REMOVAL IN OFFICE 2 WEEKS POST OP.  CALL FOR APPOINTMENT.  PLEASE TAKE ASPIRIN 81MG EVERY 12 HOURS FOR THE NEXT 35 DAYS FOR BLOOD CLOT PREVENTION.  ICE TO KNEE PLACE OVER A TOWEL, 20 MINUTES ON 20 MINUTES OFF.  PAIN MEDS AS PRESCRIBED.  CAN TAKE OVER THE COUNTER TYLENOL.  PROTONIX FOR HEARTBURN PREVENTION.  SENNA STOOL SOFTENER AND NEEDED.  WHILE ON CELEBREX AVOID ANY OTHER NSAIDS EXAMPLE(MOTRIN / ALEVE/ IBUPROPHEN NAPROXIN)   WEIGHT BEARING AS TOLERATED WITH A WALKER.  CALL OFFICE FOR APPOINTMENT       PRINCIPAL DISCHARGE DIAGNOSIS  Diagnosis: S/P total knee arthroplasty, left  Assessment and Plan of Treatment: REMOVE AQUACELL DRESSING 1 WEEK AFTER SURGERY.  OK TO SHOWER, DO NOT SATURATE, PAT DRY.  ONCE REMOVED DO NOT APPLY ANY LOTIONS OR CREAMS TO INCISION.  iF INCREASED PAIN FEVER SWELLING OR DISCHARGE/BLEEDING CALL MD OFFICE.  STAPLE REMOVAL IN OFFICE 2 WEEKS POST OP.  CALL FOR APPOINTMENT.  PLEASE TAKE ASPIRIN 81MG EVERY 12 HOURS FOR THE NEXT 35 DAYS FOR BLOOD CLOT PREVENTION.  ICE TO KNEE PLACE OVER A TOWEL, 20 MINUTES ON 20 MINUTES OFF.  PAIN MEDS AS PRESCRIBED.  CAN TAKE OVER THE COUNTER TYLENOL.  PROTONIX FOR HEARTBURN PREVENTION.  SENNA STOOL SOFTENER AND NEEDED.   WEIGHT BEARING AS TOLERATED WITH A WALKER.  CALL OFFICE FOR APPOINTMENT

## 2022-10-13 NOTE — PHYSICAL THERAPY INITIAL EVALUATION ADULT - LIVES WITH, PROFILE
Pt lives with son in a private home with 5 steps to enter with R handrail going up and 10 + 14 steps inside with L handrail going up; daughter will stay with her for one week after surgery./children

## 2022-10-13 NOTE — PRE-ANESTHESIA EVALUATION ADULT - NSANTHOSAYNRD_GEN_A_CORE
20
No. FIORDALIZA screening performed.  STOP BANG Legend: 0-2 = LOW Risk; 3-4 = INTERMEDIATE Risk; 5-8 = HIGH Risk

## 2022-10-13 NOTE — PHYSICAL THERAPY INITIAL EVALUATION ADULT - NSPTDMEREC_GEN_A_CORE
Last OV :4/27/2018  Last filled (optional ) :  RTC  :  Next appointment :6/15/2018      If over due :  Please contact patient to schedule appointment for medication follow up in order to process refill. Please do not send this encounter back until the appointment is scheduled.     If all the requirements are met then:  Medication refill requested patient's Chart has been reviewed, appropriate labs are up  to date. No further question needed for this refill .       pt has a rolling walker at home

## 2022-10-14 ENCOUNTER — TRANSCRIPTION ENCOUNTER (OUTPATIENT)
Age: 72
End: 2022-10-14

## 2022-10-14 VITALS
RESPIRATION RATE: 17 BRPM | TEMPERATURE: 97 F | HEART RATE: 63 BPM | SYSTOLIC BLOOD PRESSURE: 118 MMHG | DIASTOLIC BLOOD PRESSURE: 66 MMHG

## 2022-10-14 LAB
ANION GAP SERPL CALC-SCNC: 6 MMOL/L — LOW (ref 7–14)
APPEARANCE UR: ABNORMAL
BACTERIA # UR AUTO: ABNORMAL
BILIRUB UR-MCNC: NEGATIVE — SIGNIFICANT CHANGE UP
BUN SERPL-MCNC: 17 MG/DL — SIGNIFICANT CHANGE UP (ref 10–20)
CALCIUM SERPL-MCNC: 8.7 MG/DL — SIGNIFICANT CHANGE UP (ref 8.4–10.5)
CHLORIDE SERPL-SCNC: 105 MMOL/L — SIGNIFICANT CHANGE UP (ref 98–110)
CO2 SERPL-SCNC: 26 MMOL/L — SIGNIFICANT CHANGE UP (ref 17–32)
COLOR SPEC: ABNORMAL
CREAT SERPL-MCNC: 0.6 MG/DL — LOW (ref 0.7–1.5)
DIFF PNL FLD: ABNORMAL
EGFR: 94 ML/MIN/1.73M2 — SIGNIFICANT CHANGE UP
EPI CELLS # UR: ABNORMAL /HPF
GLUCOSE SERPL-MCNC: 112 MG/DL — HIGH (ref 70–99)
GLUCOSE UR QL: NEGATIVE MG/DL — SIGNIFICANT CHANGE UP
HCT VFR BLD CALC: 33.3 % — LOW (ref 37–47)
HGB BLD-MCNC: 11.3 G/DL — LOW (ref 12–16)
KETONES UR-MCNC: NEGATIVE — SIGNIFICANT CHANGE UP
LEUKOCYTE ESTERASE UR-ACNC: ABNORMAL
MCHC RBC-ENTMCNC: 30.9 PG — SIGNIFICANT CHANGE UP (ref 27–31)
MCHC RBC-ENTMCNC: 33.9 G/DL — SIGNIFICANT CHANGE UP (ref 32–37)
MCV RBC AUTO: 91 FL — SIGNIFICANT CHANGE UP (ref 81–99)
NITRITE UR-MCNC: NEGATIVE — SIGNIFICANT CHANGE UP
NRBC # BLD: 0 /100 WBCS — SIGNIFICANT CHANGE UP (ref 0–0)
PH UR: 6 — SIGNIFICANT CHANGE UP (ref 5–8)
PLATELET # BLD AUTO: 205 K/UL — SIGNIFICANT CHANGE UP (ref 130–400)
POTASSIUM SERPL-MCNC: 4.2 MMOL/L — SIGNIFICANT CHANGE UP (ref 3.5–5)
POTASSIUM SERPL-SCNC: 4.2 MMOL/L — SIGNIFICANT CHANGE UP (ref 3.5–5)
PROT UR-MCNC: 30 MG/DL
RBC # BLD: 3.66 M/UL — LOW (ref 4.2–5.4)
RBC # FLD: 12.7 % — SIGNIFICANT CHANGE UP (ref 11.5–14.5)
RBC CASTS # UR COMP ASSIST: >50 /HPF
SODIUM SERPL-SCNC: 137 MMOL/L — SIGNIFICANT CHANGE UP (ref 135–146)
SP GR SPEC: 1.02 — SIGNIFICANT CHANGE UP (ref 1.01–1.03)
UROBILINOGEN FLD QL: 0.2 MG/DL — SIGNIFICANT CHANGE UP
WBC # BLD: 11.49 K/UL — HIGH (ref 4.8–10.8)
WBC # FLD AUTO: 11.49 K/UL — HIGH (ref 4.8–10.8)
WBC UR QL: ABNORMAL /HPF

## 2022-10-14 PROCEDURE — 99232 SBSQ HOSP IP/OBS MODERATE 35: CPT

## 2022-10-14 RX ORDER — PANTOPRAZOLE SODIUM 20 MG/1
1 TABLET, DELAYED RELEASE ORAL
Qty: 35 | Refills: 0
Start: 2022-10-14 | End: 2022-11-17

## 2022-10-14 RX ORDER — CEFPODOXIME PROXETIL 100 MG
1 TABLET ORAL
Qty: 14 | Refills: 0
Start: 2022-10-14 | End: 2022-10-20

## 2022-10-14 RX ORDER — ACETAMINOPHEN 500 MG
2 TABLET ORAL
Qty: 0 | Refills: 0 | DISCHARGE

## 2022-10-14 RX ORDER — SENNA PLUS 8.6 MG/1
2 TABLET ORAL
Qty: 30 | Refills: 0
Start: 2022-10-14 | End: 2022-10-28

## 2022-10-14 RX ORDER — ACETAMINOPHEN 500 MG
2 TABLET ORAL
Qty: 0 | Refills: 0 | DISCHARGE
Start: 2022-10-14

## 2022-10-14 RX ORDER — OXYCODONE HYDROCHLORIDE 5 MG/1
1 TABLET ORAL
Qty: 9 | Refills: 0
Start: 2022-10-14 | End: 2022-10-16

## 2022-10-14 RX ORDER — TRAMADOL HYDROCHLORIDE 50 MG/1
1 TABLET ORAL
Qty: 28 | Refills: 0
Start: 2022-10-14 | End: 2022-10-20

## 2022-10-14 RX ORDER — ASPIRIN/CALCIUM CARB/MAGNESIUM 324 MG
1 TABLET ORAL
Qty: 70 | Refills: 0
Start: 2022-10-14 | End: 2022-11-17

## 2022-10-14 RX ADMIN — Medication 15 MILLIGRAM(S): at 06:30

## 2022-10-14 RX ADMIN — Medication 100 MILLIGRAM(S): at 05:30

## 2022-10-14 RX ADMIN — Medication 2 MILLIGRAM(S): at 11:18

## 2022-10-14 RX ADMIN — TRAMADOL HYDROCHLORIDE 50 MILLIGRAM(S): 50 TABLET ORAL at 08:11

## 2022-10-14 RX ADMIN — Medication 15 MILLIGRAM(S): at 00:30

## 2022-10-14 RX ADMIN — OXYCODONE HYDROCHLORIDE 5 MILLIGRAM(S): 5 TABLET ORAL at 00:30

## 2022-10-14 RX ADMIN — Medication 15 MILLIGRAM(S): at 11:16

## 2022-10-14 RX ADMIN — Medication 650 MILLIGRAM(S): at 00:15

## 2022-10-14 RX ADMIN — PANTOPRAZOLE SODIUM 40 MILLIGRAM(S): 20 TABLET, DELAYED RELEASE ORAL at 05:32

## 2022-10-14 RX ADMIN — Medication 15 MILLIGRAM(S): at 05:31

## 2022-10-14 RX ADMIN — Medication 650 MILLIGRAM(S): at 11:17

## 2022-10-14 RX ADMIN — Medication 1 TABLET(S): at 11:17

## 2022-10-14 RX ADMIN — Medication 650 MILLIGRAM(S): at 06:30

## 2022-10-14 RX ADMIN — Medication 81 MILLIGRAM(S): at 11:17

## 2022-10-14 RX ADMIN — Medication 650 MILLIGRAM(S): at 05:31

## 2022-10-14 RX ADMIN — Medication 650 MILLIGRAM(S): at 12:58

## 2022-10-14 NOTE — OCCUPATIONAL THERAPY INITIAL EVALUATION ADULT - GENERAL OBSERVATIONS, REHAB EVAL
8:52-9:38. Chart reviewed, ok to treat by Occupational Therapist as confirmed by RN Rochelle, Pt received seated in bedside chair +IVs on LUE +aquacel on left knee in NAD. Pt in agreement with OT IE. 8:52-9:38. Chart reviewed, ok to treat by Occupational Therapist as confirmed by RN Rochelle, Pt received seated in bedside chair +IVs on LUE (disconnected by RN) +aquacel on left knee in NAD. Pt in agreement with OT IE.

## 2022-10-14 NOTE — DISCHARGE NOTE NURSING/CASE MANAGEMENT/SOCIAL WORK - PATIENT PORTAL LINK FT
You can access the FollowMyHealth Patient Portal offered by [facility] by registering at the following website: [email or web address]. By joining [facility]’s FollowMyHealth portal, you will also be able to view your health information using other applications (apps) compatible with our system.

## 2022-10-14 NOTE — PROGRESS NOTE ADULT - SUBJECTIVE AND OBJECTIVE BOX
s/p left tkr pod 0   seen in pacu still under regional anesthesia  slight movement of right  foot   sensation present left foot   nad   pain controlled     Vital Signs Last 24 Hrs  T(C): 36.6 (13 Oct 2022 16:30), Max: 36.6 (13 Oct 2022 16:30)  T(F): 97.8 (13 Oct 2022 16:30), Max: 97.8 (13 Oct 2022 16:30)  HR: 59 (13 Oct 2022 17:45) (53 - 70)  BP: 125/62 (13 Oct 2022 17:45) (104/53 - 179/83)  BP(mean): --  RR: 16 (13 Oct 2022 17:45) (12 - 17)  SpO2: 97% (13 Oct 2022 17:45) (97% - 99%)    left lower ext:  dressing c/d/i   calf soft   soraya and ipc in place     s/p left tkr pod 0    pain control   dvt proph   am labs   pt ot   incentive   abx 24 hours   med cs   wbat   dispo     
72 y o F s/p left tka pod 1    MEDICATIONS  (STANDING):  acetaminophen     Tablet .. 650 milliGRAM(s) Oral every 6 hours  aspirin enteric coated 81 milliGRAM(s) Oral every 12 hours  celecoxib 200 milliGRAM(s) Oral every 12 hours  chlorhexidine 2% Cloths 1 Application(s) Topical <User Schedule>  dexAMETHasone     Tablet 2 milliGRAM(s) Oral once  influenza  Vaccine (HIGH DOSE) 0.7 milliLiter(s) IntraMuscular once  ketorolac   Injectable 15 milliGRAM(s) IV Push every 6 hours  multivitamin 1 Tablet(s) Oral daily  pantoprazole    Tablet 40 milliGRAM(s) Oral before breakfast  polyethylene glycol 3350 17 Gram(s) Oral at bedtime  senna 2 Tablet(s) Oral at bedtime  sodium chloride 0.9%. 1000 milliLiter(s) (100 mL/Hr) IV Continuous <Continuous>    MEDICATIONS  (PRN):  aluminum hydroxide/magnesium hydroxide/simethicone Suspension 30 milliLiter(s) Oral four times a day PRN Indigestion  magnesium hydroxide Suspension 30 milliLiter(s) Oral daily PRN Constipation  ondansetron Injectable 4 milliGRAM(s) IV Push every 6 hours PRN Nausea and/or Vomiting  oxyCODONE    IR 5 milliGRAM(s) Oral every 4 hours PRN breakthrough pain  traMADol 50 milliGRAM(s) Oral every 4 hours PRN Moderate Pain (4 - 6)      Pt s/e, comfortable in chair, eating breakfast. C/o LLE pain, denies CP, SOB, N/V, dizziness. Also c/o hematuria, pt had it over last month on and off, did not see urologist. No abd or suprapubic pain, no pain on urination. Pt walked in am.    NAD    Vital Signs Last 24 Hrs  T(C): 35.8 (14 Oct 2022 05:00), Max: 36.6 (13 Oct 2022 16:30)  T(F): 96.5 (14 Oct 2022 05:00), Max: 97.8 (13 Oct 2022 16:30)  HR: 52 (14 Oct 2022 05:00) (52 - 70)  BP: 113/54 (14 Oct 2022 05:00) (104/53 - 180/77)  BP(mean): --  RR: 16 (14 Oct 2022 05:00) (12 - 18)  SpO2: 98% (13 Oct 2022 18:30) (97% - 99%)    Parameters below as of 13 Oct 2022 18:00  Patient On (Oxygen Delivery Method): room air    PE:  dressing d/c/i  compartments soft  NVID  calf soft, NT  foot wwp                          11.3   11.49 )-----------( 205      ( 14 Oct 2022 07:39 )             33.3     BMP pending        PT/OT, wbat  pain control  DVT/GI ppx  f/u bmp  UA, urine cultures  postop abx completed  home meds  discharge planning

## 2022-10-14 NOTE — CONSULT NOTE ADULT - SUBJECTIVE AND OBJECTIVE BOX
71 y/o male with PHX of Breast ca s/p lumpectomy, s/p chemo, ovarian cyst, OA s/p L TKR 10/13/22 consulted for hematuria. Seen and examined, in bed, NAD. Pt states the hematuria is on and off x ~ 2 months. Pt not forth coming regarding PMH. Pt actually denied PMH. Above hx was obtained from chart. However, pt mentioned weight loss of 60 lbs within 10 months. Denies urinary hesitancy, dysuria, burning on urination.     Vital Signs Last 24 Hrs  T(C): 35.8 (14 Oct 2022 05:00), Max: 36.6 (13 Oct 2022 16:30)  T(F): 96.5 (14 Oct 2022 05:00), Max: 97.8 (13 Oct 2022 16:30)  HR: 52 (14 Oct 2022 05:00) (52 - 70)  BP: 113/54 (14 Oct 2022 05:00) (104/53 - 180/77)  BP(mean): --  RR: 16 (14 Oct 2022 05:00) (12 - 18)  SpO2: 98% (13 Oct 2022 18:30) (97% - 99%)    Parameters below as of 13 Oct 2022 18:00  Patient On (Oxygen Delivery Method): room air    PHYSICAL EXAM:      Constitutional: NAD, A&O x3    Eyes: PERRLA, no conjuctivitis    Neck: no lymphadenopathy    Respiratory: +air entry, no rales, no rhonchi, no wheezes    Cardiovascular: +S1 and S2, regular rate and rhythm    Gastrointestinal: +BS, soft, non-tender, not distended    Extremities:  no edema, no calf tenderness,     Neurological: sensation intact, ROM equal B/L, CN II-XII intact    Skin: no rashes, normal turgor                                       71 y/o male with PHX of Breast ca s/p lumpectomy, s/p chemo, ovarian cyst, OA s/p L TKR 10/13/22 consulted for hematuria. Seen and examined, in bed, NAD. Pt states the hematuria is on and off x ~ 2 months. Pt not forth coming regarding PMH. Pt actually denied PMH. Above hx was obtained from chart. However, pt mentioned weight loss of 60 lbs within 10 months. Denies urinary hesitancy, dysuria, burning on urination.     Vital Signs Last 24 Hrs  T(C): 35.8 (14 Oct 2022 05:00), Max: 36.6 (13 Oct 2022 16:30)  T(F): 96.5 (14 Oct 2022 05:00), Max: 97.8 (13 Oct 2022 16:30)  HR: 52 (14 Oct 2022 05:00) (52 - 70)  BP: 113/54 (14 Oct 2022 05:00) (104/53 - 180/77)  BP(mean): --  RR: 16 (14 Oct 2022 05:00) (12 - 18)  SpO2: 98% (13 Oct 2022 18:30) (97% - 99%)    Parameters below as of 13 Oct 2022 18:00  Patient On (Oxygen Delivery Method): room air    PHYSICAL EXAM:      Constitutional: NAD, A&O x3    Eyes: PERRLA, no conjuctivitis    Neck: no lymphadenopathy    Respiratory: +air entry, no rales, no rhonchi, no wheezes    Cardiovascular: +S1 and S2, regular rate and rhythm    Gastrointestinal: +BS, soft, non-tender, not distended    Extremities:  no edema, no calf tenderness,     Neurological: sensation intact, ROM equal B/L, CN II-XII intact    Skin: no rashes, normal turgor                              Labs pending.

## 2022-10-14 NOTE — CONSULT NOTE ADULT - ASSESSMENT
71 y/o male with PHX of Breast ca s/p lumpectomy, s/p chemo, ovarian cyst, OA s/p L TKR 10/13/22 consulted for hematuria. Seen and examined, in bed, NAD. Pt states the hematuria is on and off x ~ 2 months. Pt not forth coming regarding PMH. Pt actually denied PMH. Above hx was obtained from chart. However, pt mentioned weight loss of 60 lbs within 10 months. Denies urinary hesitancy, dysuria, burning on urination.     A/P:  -CBC   -IV hydration   -Will discussed with Dr. Bliss    73 y/o male with PHX of Breast ca s/p lumpectomy, s/p chemo, ovarian cyst, OA s/p L TKR 10/13/22 consulted for hematuria. Seen and examined, in bed, NAD. Pt states the hematuria is on and off x ~ 2 months. Pt not forth coming regarding PMH. Pt actually denied PMH. Above hx was obtained from chart. However, pt mentioned weight loss of 60 lbs within 10 months. Denies urinary hesitancy, dysuria, burning on urination.     A/P:  -CBC, BMP  -IV hydration   -Will discussed with Dr. Bliss    73 y/o male with PHX of Breast ca s/p lumpectomy, s/p chemo, ovarian cyst, OA s/p L TKR 10/13/22 consulted for hematuria. Seen and examined, in bed, NAD. Pt states the hematuria is on and off x ~ 2 months. Pt not forth coming regarding PMH. Pt actually denied PMH. Above hx was obtained from chart. However, pt mentioned weight loss of 60 lbs within 10 months. Denies urinary hesitancy, dysuria, burning on urination.     A/P:  -CBC, BMP  -IV hydration   -Will discussed with Dr. Uribe

## 2022-10-14 NOTE — OCCUPATIONAL THERAPY INITIAL EVALUATION ADULT - LIVES WITH, PROFILE
Initial (On Arrival) Lives in a private home with son who lives on the upper floor with 5 steps to enter with R hand railing and 10+14 steps inside with left hand railing but patient plans to stay on the first floor level +tub +tub chair. Patient's daughter will stay and help until November/children

## 2022-10-14 NOTE — DISCHARGE NOTE NURSING/CASE MANAGEMENT/SOCIAL WORK - NSDCPEFALRISK_GEN_ALL_CORE
For information on Fall & Injury Prevention, visit: [email or web address] OR  [email or web address] OR  https://www.cdc.gov/steadi/patient.html

## 2022-10-14 NOTE — CHART NOTE - NSCHARTNOTEFT_GEN_A_CORE
Pt complaining of hematuria. Denies any dysuria. States she had spot blood in urine, now it is worsening.     H/H WNL.    PE:  : no SPT, no CVAT    Will order urology consult

## 2022-10-14 NOTE — CONSULT NOTE ADULT - SUBJECTIVE AND OBJECTIVE BOX
SUBJECTIVE:    Patient is a 72y old Female who presents with a chief complaint of left knee arthroplasty  (13 Oct 2022 16:26)    Currently admitted to medicine with the primary diagnosis of S/P total knee arthroplasty, left       Today is hospital day 1d. This morning she is resting comfortably in chair     ROS:   CONSTITUTIONAL: No weakness, fevers or chills   EYES/ENT: No visual changes; No vertigo or throat pain   NECK: No pain or stiffness   RESPIRATORY: No cough, wheezing, hemoptysis; No shortness of breath   CARDIOVASCULAR: No chest pain or palpitations   GASTROINTESTINAL: No abdominal or epigastric pain. No nausea, vomiting, or hematemesis; No diarrhea or constipation. No melena or hematochezia.  GENITOURINARY: No dysuria, frequency, + hematuria   NEUROLOGICAL: No numbness or weakness        PAST MEDICAL & SURGICAL HISTORY  Breast CA  Right    Ex-cigarette smoker  As a Teenager    History of chemotherapy  Last 1 0yrs ago    GERD (gastroesophageal reflux disease)    Mild obesity  BMI 26.8    S/P lumpectomy of breast    Port-A-Cath in place  10 yrs    H/O ovarian cystectomy      SOCIAL HISTORY:    ALLERGIES:  [This allergen will not trigger allergy alert] ITCH (Other)  No Known Drug Allergies    MEDICATIONS:  STANDING MEDICATIONS  acetaminophen     Tablet .. 650 milliGRAM(s) Oral every 6 hours  aspirin enteric coated 81 milliGRAM(s) Oral every 12 hours  celecoxib 200 milliGRAM(s) Oral every 12 hours  chlorhexidine 2% Cloths 1 Application(s) Topical <User Schedule>  dexAMETHasone     Tablet 2 milliGRAM(s) Oral once  influenza  Vaccine (HIGH DOSE) 0.7 milliLiter(s) IntraMuscular once  ketorolac   Injectable 15 milliGRAM(s) IV Push every 6 hours  multivitamin 1 Tablet(s) Oral daily  pantoprazole    Tablet 40 milliGRAM(s) Oral before breakfast  polyethylene glycol 3350 17 Gram(s) Oral at bedtime  senna 2 Tablet(s) Oral at bedtime  sodium chloride 0.9%. 1000 milliLiter(s) IV Continuous <Continuous>    PRN MEDICATIONS  aluminum hydroxide/magnesium hydroxide/simethicone Suspension 30 milliLiter(s) Oral four times a day PRN  magnesium hydroxide Suspension 30 milliLiter(s) Oral daily PRN  ondansetron Injectable 4 milliGRAM(s) IV Push every 6 hours PRN  oxyCODONE    IR 5 milliGRAM(s) Oral every 4 hours PRN  traMADol 50 milliGRAM(s) Oral every 4 hours PRN    VITALS:   T(F): 96.5  HR: 52  BP: 113/54  RR: 16  SpO2: 98%    LABS:  Negative for smoking/alcohol/drug use.                         11.3   11.49 )-----------( 205      ( 14 Oct 2022 07:39 )             33.3     10-14    137  |  105  |  17  ----------------------------<  112<H>  4.2   |  26  |  0.6<L>    Ca    8.7      14 Oct 2022 07:39        Urinalysis Basic - ( 14 Oct 2022 08:00 )    Color: Brown / Appearance: Cloudy / S.020 / pH: x  Gluc: x / Ketone: Negative  / Bili: Negative / Urobili: 0.2 mg/dL   Blood: x / Protein: 30 mg/dL / Nitrite: Negative   Leuk Esterase: Trace / RBC: >50 /HPF / WBC 6-10 /HPF   Sq Epi: x / Non Sq Epi: Few /HPF / Bacteria: Few      RADIOLOGY:    PHYSICAL EXAM:  GEN: No acute distress  HEENT: normocephalic, atraumatic, aniceteric  LUNGS: Clear to auscultation bilaterally, no rales/wheezing/ rhonchi  HEART: S1/S2 present. RRR, no murmurs  ABD: Soft, non-tender, non-distended. Bowel sounds present  EXT: no edema   NEURO: AAOX3, normal affect      ASSESSMENT AND PLAN:    71 yo F with pmh of breast cancer sp chemo and lumpectomy, OA presented to the hospital L knee OA sp L TKA     #sp L TKA   - dvt ppx and pain management per ortho primary team  - Pt/OT  - bowel regimen  - incentive spirometer  - PCP f/u in 1-2 weeks     # Hematuria  - hemodynamically stable  - urinalysis positive , fu urine clx   - vantin 100 mg bid for 7 days   - evaluated by urology outpatient f/u  - PTP fu in 1 week

## 2022-10-14 NOTE — OCCUPATIONAL THERAPY INITIAL EVALUATION ADULT - PERTINENT HX OF CURRENT PROBLEM, REHAB EVAL
Admitted for OA of the left knee; s/p left total knee arthroplasty, regional anesthesia, POD 1. Admitted for OA of the left knee; s/p left total knee arthroplasty, regional anesthesia, POD 1.    Pt has RW, cane, and commode at home

## 2022-10-14 NOTE — CHART NOTE - NSCHARTNOTEFT_GEN_A_CORE
Called PA by Lulu whom seen pt on consultation today. Case was d/w Dr Pichardo whom is recommending a CT urogram and cystoscopy which can be done as outpatient.

## 2022-10-15 LAB
CULTURE RESULTS: NO GROWTH — SIGNIFICANT CHANGE UP
SPECIMEN SOURCE: SIGNIFICANT CHANGE UP

## 2022-10-15 RX ORDER — OXYCODONE 5 MG/1
5 TABLET ORAL
Qty: 42 | Refills: 0 | Status: ACTIVE | COMMUNITY
Start: 2022-10-15 | End: 1900-01-01

## 2022-10-19 DIAGNOSIS — K21.9 GASTRO-ESOPHAGEAL REFLUX DISEASE WITHOUT ESOPHAGITIS: ICD-10-CM

## 2022-10-19 DIAGNOSIS — M17.12 UNILATERAL PRIMARY OSTEOARTHRITIS, LEFT KNEE: ICD-10-CM

## 2022-10-19 DIAGNOSIS — Z85.3 PERSONAL HISTORY OF MALIGNANT NEOPLASM OF BREAST: ICD-10-CM

## 2022-10-19 DIAGNOSIS — E66.9 OBESITY, UNSPECIFIED: ICD-10-CM

## 2022-10-19 DIAGNOSIS — Z87.891 PERSONAL HISTORY OF NICOTINE DEPENDENCE: ICD-10-CM

## 2022-10-19 DIAGNOSIS — R31.9 HEMATURIA, UNSPECIFIED: ICD-10-CM

## 2022-10-19 DIAGNOSIS — Z96.641 PRESENCE OF RIGHT ARTIFICIAL HIP JOINT: ICD-10-CM

## 2022-10-19 DIAGNOSIS — Z92.21 PERSONAL HISTORY OF ANTINEOPLASTIC CHEMOTHERAPY: ICD-10-CM

## 2022-10-19 LAB — SURGICAL PATHOLOGY STUDY: SIGNIFICANT CHANGE UP

## 2022-10-27 ENCOUNTER — APPOINTMENT (OUTPATIENT)
Dept: ORTHOPEDIC SURGERY | Facility: CLINIC | Age: 72
End: 2022-10-27

## 2022-10-28 ENCOUNTER — APPOINTMENT (OUTPATIENT)
Dept: ORTHOPEDIC SURGERY | Facility: CLINIC | Age: 72
End: 2022-10-28

## 2022-10-28 PROCEDURE — 99024 POSTOP FOLLOW-UP VISIT: CPT

## 2022-10-28 NOTE — PHYSICAL EXAM
[de-identified] : Left Knee\par Inspection: no effusion\par Wounds: Incision is clean and dry, staples are intact\par Alignment: normal.\par Palpation: no specific tenderness on palpation.\par ROM: 5-85 degrees\par Muscle Test: good quad strength.\par Leg examination: calf is soft and non-tender

## 2022-10-28 NOTE — HISTORY OF PRESENT ILLNESS
[de-identified] : 72 year old female s/p left total knee replacement presents to the office today for her 2 week follow up. She is here today for staple removal. Her post op course has been complicated with hematuria for which patient is going to follow with a urologist. Patient is ambulating with a cane today. She reports taking Oxycodone and Tylenol for pain but does not feel her pain is being well controlled. She has continued to do physical therapy at home and will be completing this within the week. Patient states she has been taking Aspirin 81 mg twice daily for DVT prophylaxis. Patient denies any fevers, chills, drainage from the incision site, chest pain, or shortness of breath.\par

## 2022-10-28 NOTE — ASSESSMENT
[FreeTextEntry1] : 72 year old female approximately two weeks status post left total knee replacement. Staples were removed from the incision site and steri-strips were applied. Pt was instructed she may shower, allowing the soap and water to run over the incision site, but not to directly scrub over the wound. Pt is to continue Aspirin 81 mg twice daily for DVT prophylaxis. She has been suffering from hematuria and has been advised to see a urologist for which she got an appointment November 17th. I advised the patient try to see her primary doctor and also have them information for a urogynecologist. Pt is to follow up in four weeks for reassessment. In the interim, if she develops any fevers, erythema, drainage from the incision site, or any concerning symptoms, we will see her prior to her scheduled appointment.\par

## 2022-11-01 RX ORDER — OXYCODONE 5 MG/1
5 TABLET ORAL
Qty: 28 | Refills: 0 | Status: ACTIVE | COMMUNITY
Start: 2022-11-01 | End: 1900-01-01

## 2022-11-14 ENCOUNTER — APPOINTMENT (OUTPATIENT)
Dept: UROLOGY | Facility: CLINIC | Age: 72
End: 2022-11-14

## 2022-11-21 ENCOUNTER — APPOINTMENT (OUTPATIENT)
Dept: OBGYN | Facility: CLINIC | Age: 72
End: 2022-11-21

## 2022-11-21 VITALS — BODY MASS INDEX: 26.21 KG/M2 | HEIGHT: 67 IN | TEMPERATURE: 98 F | WEIGHT: 167 LBS

## 2022-11-21 DIAGNOSIS — Z80.3 FAMILY HISTORY OF MALIGNANT NEOPLASM OF BREAST: ICD-10-CM

## 2022-11-21 DIAGNOSIS — Z78.9 OTHER SPECIFIED HEALTH STATUS: ICD-10-CM

## 2022-11-21 PROCEDURE — 58100 BIOPSY OF UTERUS LINING: CPT

## 2022-11-21 PROCEDURE — 99387 INIT PM E/M NEW PAT 65+ YRS: CPT

## 2022-11-22 PROBLEM — Z78.9 NO PERTINENT FAMILY HISTORY: Status: INACTIVE | Noted: 2021-07-28 | Resolved: 2022-11-22

## 2022-11-22 PROBLEM — Z80.3 FAMILY HISTORY OF MALIGNANT NEOPLASM OF BREAST: Status: ACTIVE | Noted: 2022-11-22

## 2022-11-22 NOTE — PROCEDURE
[Endometrial Biopsy] : Endometrial biopsy [Post-Menop. Bleeding] : post-menopausal bleeding [Risks] : risks [Benefits] : benefits [Alternatives] : alternatives [Patient] : patient [Infection] : infection [Allergic Reaction] : allergic reaction [Uterine Perforation] : uterine perforation [Pain] : pain [N/A] : pregnancy test not applicable [No Premedication] : No premedication [None] : none [Tenaculum] : Tenaculum [Required Dilation] : required dilation [Sounded to ___ cm] : sounded to [unfilled] ~Ucm [Anteverted] : anteverted [Scant] : scant [Sent to Pathology] : placed in buffered formalin and sent for pathology [Tolerated Well] : Patient tolerated the procedure well [Bleeding] : excessive bleeding was noted [LMPDate] : postmenopausal [de-identified] : had moderate bleeding after passage of os finder, eventually stopped

## 2022-11-22 NOTE — DISCUSSION/SUMMARY
[FreeTextEntry1] : 71 yo G0 postmenopausal, h/o breast cancer, nocturia, postmenopausal bleeding, new patient. \par \par - f/up pap, HPV\par - EmBx performed for multiple episodes of postmenopausal bleeding - will call with results\par - scheduled TVUS endometrium evaluation \par - urogyn referral\par - referral for mammogram\par - schedule DEXA\par - encouraged patient to find PMD for health care maintenance\par - encouraged to schedule colonoscopy\par - offered genetic testing for personal and family history of breast cancer, patient declined\par - will call with biopsy results and discuss follow up at that time

## 2022-11-22 NOTE — HISTORY OF PRESENT ILLNESS
[FreeTextEntry1] : 73 yo P0 postmenopausal here for new GYN exam. Has been many years since last seen by GYN. She reports LMP in early 50s but is unsure. Has had multiple episodes of blood on her pad over the past few months. Has daily nocturia and wears a pad overnight. Not sexually active. No menopausal symptoms. \par *patient is poor historian**\par \par Last pap, many years ago, normal per pt\par Last mammo many years ago - has history of breast cancer diagnosed in 60s s/p lumpectomy and chemo (still has chemo port)\par Last colonoscopy - never\par Last DEXA several years ago - normal per pt\par \par Ob hx: G0\par GYN h/o fibroids? \par denies h/o cysts, abnormal paps, STIs\par PMH breast cancer (diagnosed in 50s s/p chemo and lumpectomy)\par meds none\par NKDA\par PSH, THR, TKR, lumpectomy\par social denies\par family hx: breast cancer in mother
 used

## 2022-11-22 NOTE — PHYSICAL EXAM
[Appropriately responsive] : appropriately responsive [Alert] : alert [No Acute Distress] : no acute distress [No Lymphadenopathy] : no lymphadenopathy [Regular Rate Rhythm] : regular rate rhythm [No Murmurs] : no murmurs [Clear to Auscultation B/L] : clear to auscultation bilaterally [Soft] : soft [Non-tender] : non-tender [Non-distended] : non-distended [No HSM] : No HSM [No Lesions] : no lesions [No Mass] : no mass [Oriented x3] : oriented x3 [Examination Of The Breasts] : a normal appearance [No Masses] : no breast masses were palpable [Vulvar Atrophy] : vulvar atrophy [Labia Majora] : normal [Labia Minora] : normal [Atrophy] : atrophy [Normal] : normal [Uterine Adnexae] : normal [FreeTextEntry4] : scant dark blood in vagina

## 2022-11-23 ENCOUNTER — APPOINTMENT (OUTPATIENT)
Dept: ORTHOPEDIC SURGERY | Facility: CLINIC | Age: 72
End: 2022-11-23

## 2022-11-23 LAB — HPV HIGH+LOW RISK DNA PNL CVX: NOT DETECTED

## 2022-11-23 PROCEDURE — 99024 POSTOP FOLLOW-UP VISIT: CPT

## 2022-11-23 PROCEDURE — 73562 X-RAY EXAM OF KNEE 3: CPT | Mod: LT

## 2022-11-23 NOTE — PHYSICAL EXAM
[de-identified] : Left Knee\par Inspection: no effusion\par Wounds: healed midline incision, no drainage or erythema\par Alignment: normal.\par Palpation: no specific tenderness on palpation.\par ROM: 0-105 degrees\par Muscle Test: good quad strength.\par Leg examination: calf is soft and non-tender [de-identified] : Imaging done today, AP, lateral and skyline of the left knee shows a well aligned cemented PS TKA.

## 2022-11-23 NOTE — HISTORY OF PRESENT ILLNESS
[de-identified] : 72 year old female s/p left total knee replacement presents to the office today for her 6 week follow up. Patient is ambulating with a cane today. Her post operative period was complicated by hematuria and she is under the care of a gynecologist now who ruled this to not be true hematuria. She is under going a work up for post menopausal vaginal bleeding. She reports doing physical therapy 2 x per week with good mobility. She denies taking much of anything at this point for pain. She has been taking Aspirin 81 mg twice daily for DVT prophylaxis. Patient reports getting back to her ADLs. Overall, patient reports doing well.\par

## 2022-11-23 NOTE — ASSESSMENT
[FreeTextEntry1] : 78 year old female approximately 6 weeks s/p left total knee replacement. She ambulates with a cane today. Patient has good pain relief and is happy with her result so far. She was advised to continue to work on her extension, putting a rolled towel under her ankle and actively extending. She should continue her home exercise regimen. Patient was advised she can stop taking Aspirin 81 mg for DVT prophylaxis. Patient is doing well. We can see her back in 6 weeks time for her 3 month follow up.\par

## 2022-12-01 ENCOUNTER — NON-APPOINTMENT (OUTPATIENT)
Age: 72
End: 2022-12-01

## 2022-12-01 LAB — CYTOLOGY CVX/VAG DOC THIN PREP: NORMAL

## 2022-12-02 ENCOUNTER — APPOINTMENT (OUTPATIENT)
Dept: OBGYN | Facility: CLINIC | Age: 72
End: 2022-12-02
Payer: MEDICARE

## 2022-12-02 PROCEDURE — 76830 TRANSVAGINAL US NON-OB: CPT

## 2022-12-07 ENCOUNTER — APPOINTMENT (OUTPATIENT)
Dept: GYNECOLOGIC ONCOLOGY | Facility: CLINIC | Age: 72
End: 2022-12-07
Payer: MEDICARE

## 2022-12-07 VITALS
HEIGHT: 67 IN | WEIGHT: 160 LBS | DIASTOLIC BLOOD PRESSURE: 89 MMHG | BODY MASS INDEX: 25.11 KG/M2 | SYSTOLIC BLOOD PRESSURE: 139 MMHG

## 2022-12-07 DIAGNOSIS — M25.551 PAIN IN RIGHT HIP: ICD-10-CM

## 2022-12-07 DIAGNOSIS — Z87.42 PERSONAL HISTORY OF OTHER DISEASES OF THE FEMALE GENITAL TRACT: ICD-10-CM

## 2022-12-07 DIAGNOSIS — M25.561 PAIN IN RIGHT KNEE: ICD-10-CM

## 2022-12-07 DIAGNOSIS — Z98.890 OTHER SPECIFIED POSTPROCEDURAL STATES: ICD-10-CM

## 2022-12-07 DIAGNOSIS — M17.12 UNILATERAL PRIMARY OSTEOARTHRITIS, LEFT KNEE: ICD-10-CM

## 2022-12-07 DIAGNOSIS — M25.562 PAIN IN LEFT KNEE: ICD-10-CM

## 2022-12-07 DIAGNOSIS — M54.50 LOW BACK PAIN, UNSPECIFIED: ICD-10-CM

## 2022-12-07 DIAGNOSIS — Z92.89 PERSONAL HISTORY OF OTHER MEDICAL TREATMENT: ICD-10-CM

## 2022-12-07 PROCEDURE — 99215 OFFICE O/P EST HI 40 MIN: CPT | Mod: 57

## 2022-12-07 NOTE — REVIEW OF SYSTEMS
[Negative] : Musculoskeletal [Rash] : no rash noted [Ulcer] : no ulcer was seen [Syncope] : no syncope noted [Neuropathy] : no neuropathy [Depression] : no depression [Hematuria] : no hematuria [Dysuria] : no dysuria [Abn Vag Bleeding] : abnormal vaginal bleeding [Incontinence] : incontinence [Normal Sexual Function] : abnormal sexual function [Fatigue] : no fatigue [Recent Wt Gain ___ Lbs] : no recent weight gain [Recent Wt Loss___ Lbs] : recent weight loss of [unfilled] lbs [Chest Pain] : no chest pain [DE JESUS] : no dyspnea on exertion [Wheezing] : no wheezing [SOB on Exertion] : no shortness of breath during exertion [Bloody Stools] : no bloody stools [Muscle Weakness] : no muscle weakness [de-identified] : R knee swelling s/p TKR

## 2022-12-07 NOTE — PHYSICAL EXAM
[Absent] : CVAT: absent [Normal] : Recto-Vaginal Exam: Normal [FreeTextEntry1] : NAD [de-identified] : RERE [de-identified] : no edema [de-identified] : soft NT/ND [de-identified] : 2x2 cm [de-identified] : 10 wk size [de-identified] : no masses [Restricted in physically strenuous activity but ambulatory and able to carry out work of a light or sedentary nature] : Status 1- Restricted in physically strenuous activity but ambulatory and able to carry out work of a light or sedentary nature, e.g., light house work, office work

## 2022-12-07 NOTE — HISTORY OF PRESENT ILLNESS
[FreeTextEntry1] : 71 y/o with PMB since 10/2022\par \par Had EMBx which showed a grade 3 endometrial cancer

## 2022-12-07 NOTE — DISCUSSION/SUMMARY
[Visit Time ___ Minutes] : [unfilled] minutes [Face to Face Time___ Minutes] : with [unfilled] minutes in face to face consultation. [FreeTextEntry1] : Discussed with patient the results of her biopsy , as well as the pathophysiology of her condition.\par \par The majority of endometrial cancers are stage 1 and require surgery alone, but in certain cases adjuvant therapy is necessary\par Surgery is necessary, recommend robotic hysterectomy, BSO, sentinel lymph node mapping and biopsy, PA LND and omentectomy\par Risks: bleeding, infection and injury, especially to major vessels at time of LND and urinary tract (1%)\par \par Answered all the patient questions, as well as her daughter's. She would like to expedite the procedure as much as possible.\par \par Preop CT, medical clearance\par

## 2022-12-08 ENCOUNTER — NON-APPOINTMENT (OUTPATIENT)
Age: 72
End: 2022-12-08

## 2022-12-09 ENCOUNTER — OUTPATIENT (OUTPATIENT)
Dept: OUTPATIENT SERVICES | Facility: HOSPITAL | Age: 72
LOS: 1 days | Discharge: HOME | End: 2022-12-09

## 2022-12-09 VITALS
TEMPERATURE: 98 F | WEIGHT: 171.08 LBS | OXYGEN SATURATION: 97 % | RESPIRATION RATE: 16 BRPM | HEIGHT: 65 IN | DIASTOLIC BLOOD PRESSURE: 86 MMHG | HEART RATE: 66 BPM | SYSTOLIC BLOOD PRESSURE: 140 MMHG

## 2022-12-09 DIAGNOSIS — C54.1 MALIGNANT NEOPLASM OF ENDOMETRIUM: ICD-10-CM

## 2022-12-09 DIAGNOSIS — Z01.818 ENCOUNTER FOR OTHER PREPROCEDURAL EXAMINATION: ICD-10-CM

## 2022-12-09 DIAGNOSIS — Z96.659 PRESENCE OF UNSPECIFIED ARTIFICIAL KNEE JOINT: Chronic | ICD-10-CM

## 2022-12-09 DIAGNOSIS — Z95.828 PRESENCE OF OTHER VASCULAR IMPLANTS AND GRAFTS: Chronic | ICD-10-CM

## 2022-12-09 DIAGNOSIS — Z98.890 OTHER SPECIFIED POSTPROCEDURAL STATES: Chronic | ICD-10-CM

## 2022-12-09 LAB
ALBUMIN SERPL ELPH-MCNC: 4.4 G/DL — SIGNIFICANT CHANGE UP (ref 3.5–5.2)
ALP SERPL-CCNC: 129 U/L — HIGH (ref 30–115)
ALT FLD-CCNC: 13 U/L — SIGNIFICANT CHANGE UP (ref 0–41)
ANION GAP SERPL CALC-SCNC: 11 MMOL/L — SIGNIFICANT CHANGE UP (ref 7–14)
APTT BLD: 30.4 SEC — SIGNIFICANT CHANGE UP (ref 27–39.2)
AST SERPL-CCNC: 18 U/L — SIGNIFICANT CHANGE UP (ref 0–41)
BASOPHILS # BLD AUTO: 0.04 K/UL — SIGNIFICANT CHANGE UP (ref 0–0.2)
BASOPHILS NFR BLD AUTO: 0.7 % — SIGNIFICANT CHANGE UP (ref 0–1)
BILIRUB SERPL-MCNC: 0.5 MG/DL — SIGNIFICANT CHANGE UP (ref 0.2–1.2)
BLD GP AB SCN SERPL QL: SIGNIFICANT CHANGE UP
BUN SERPL-MCNC: 13 MG/DL — SIGNIFICANT CHANGE UP (ref 10–20)
CALCIUM SERPL-MCNC: 9.8 MG/DL — SIGNIFICANT CHANGE UP (ref 8.4–10.5)
CHLORIDE SERPL-SCNC: 105 MMOL/L — SIGNIFICANT CHANGE UP (ref 98–110)
CO2 SERPL-SCNC: 26 MMOL/L — SIGNIFICANT CHANGE UP (ref 17–32)
CREAT SERPL-MCNC: 0.7 MG/DL — SIGNIFICANT CHANGE UP (ref 0.7–1.5)
EGFR: 91 ML/MIN/1.73M2 — SIGNIFICANT CHANGE UP
EOSINOPHIL # BLD AUTO: 0.16 K/UL — SIGNIFICANT CHANGE UP (ref 0–0.7)
EOSINOPHIL NFR BLD AUTO: 2.8 % — SIGNIFICANT CHANGE UP (ref 0–8)
GLUCOSE SERPL-MCNC: 93 MG/DL — SIGNIFICANT CHANGE UP (ref 70–99)
HCT VFR BLD CALC: 44 % — SIGNIFICANT CHANGE UP (ref 37–47)
HGB BLD-MCNC: 14.8 G/DL — SIGNIFICANT CHANGE UP (ref 12–16)
IMM GRANULOCYTES NFR BLD AUTO: 0.2 % — SIGNIFICANT CHANGE UP (ref 0.1–0.3)
INR BLD: 0.99 RATIO — SIGNIFICANT CHANGE UP (ref 0.65–1.3)
LYMPHOCYTES # BLD AUTO: 1.62 K/UL — SIGNIFICANT CHANGE UP (ref 1.2–3.4)
LYMPHOCYTES # BLD AUTO: 28.4 % — SIGNIFICANT CHANGE UP (ref 20.5–51.1)
MCHC RBC-ENTMCNC: 30.1 PG — SIGNIFICANT CHANGE UP (ref 27–31)
MCHC RBC-ENTMCNC: 33.6 G/DL — SIGNIFICANT CHANGE UP (ref 32–37)
MCV RBC AUTO: 89.4 FL — SIGNIFICANT CHANGE UP (ref 81–99)
MONOCYTES # BLD AUTO: 0.39 K/UL — SIGNIFICANT CHANGE UP (ref 0.1–0.6)
MONOCYTES NFR BLD AUTO: 6.8 % — SIGNIFICANT CHANGE UP (ref 1.7–9.3)
NEUTROPHILS # BLD AUTO: 3.48 K/UL — SIGNIFICANT CHANGE UP (ref 1.4–6.5)
NEUTROPHILS NFR BLD AUTO: 61.1 % — SIGNIFICANT CHANGE UP (ref 42.2–75.2)
NRBC # BLD: 0 /100 WBCS — SIGNIFICANT CHANGE UP (ref 0–0)
PLATELET # BLD AUTO: 301 K/UL — SIGNIFICANT CHANGE UP (ref 130–400)
POTASSIUM SERPL-MCNC: 5.2 MMOL/L — HIGH (ref 3.5–5)
POTASSIUM SERPL-SCNC: 5.2 MMOL/L — HIGH (ref 3.5–5)
PROT SERPL-MCNC: 7.3 G/DL — SIGNIFICANT CHANGE UP (ref 6–8)
PROTHROM AB SERPL-ACNC: 11.3 SEC — SIGNIFICANT CHANGE UP (ref 9.95–12.87)
RBC # BLD: 4.92 M/UL — SIGNIFICANT CHANGE UP (ref 4.2–5.4)
RBC # FLD: 12.6 % — SIGNIFICANT CHANGE UP (ref 11.5–14.5)
SODIUM SERPL-SCNC: 142 MMOL/L — SIGNIFICANT CHANGE UP (ref 135–146)
WBC # BLD: 5.7 K/UL — SIGNIFICANT CHANGE UP (ref 4.8–10.8)
WBC # FLD AUTO: 5.7 K/UL — SIGNIFICANT CHANGE UP (ref 4.8–10.8)

## 2022-12-09 PROCEDURE — 93010 ELECTROCARDIOGRAM REPORT: CPT

## 2022-12-09 NOTE — H&P PST ADULT - NSANTHOSAYNRD_GEN_A_CORE
No. FIORDALIZA screening performed.  STOP BANG Legend: 0-2 = LOW Risk; 3-4 = INTERMEDIATE Risk; 5-8 = HIGH Risk (4) no impairment

## 2022-12-09 NOTE — H&P PST ADULT - HISTORY OF PRESENT ILLNESS
73 Y/O FEMALE PRESENTS TO PAST WITH C/O               PT NOW FOR SCHEDULED PROCEDURE ( HYSTERECTOMY . PT DENIES ANY CP SOB PALP COUGH DYSURIA FEVER URI. PT ABLE TO DAILY 1-2 FOS W/O SOB  pt denies any covid s/s, or tested positive in the past  pt advised self quarantine till day of procedure  Anesthesia Alert  NO--Difficult Airway  NO--History of neck surgery or radiation  NO--Limited ROM of neck  NO--History of Malignant hyperthermia  NO--Personal or family history of Pseudocholinesterase deficiency.  NO--Prior Anesthesia Complication  NO--Latex Allergy  NO--Loose teeth  NO--History of Rheumatoid Arthritis  NO--FIORDALIZA  NO--Bleeding risk  NO--Other_____     73 Y/O FEMALE PRESENTS TO PAST WITH C/O UTERINE BLEEDING SINCE 10/22  PT NOW FOR SCHEDULED PROCEDURE ( HYSTERECTOMY . PT DENIES ANY CP SOB PALP COUGH DYSURIA FEVER URI. PT ABLE TO DAILY 1-2 FOS W/O SOB  pt denies any covid s/s, or tested positive  pt advised self quarantine till day of procedure  Anesthesia Alert  NO--Difficult Airway  NO--History of neck surgery or radiation  NO--Limited ROM of neck  NO--History of Malignant hyperthermia  NO--Personal or family history of Pseudocholinesterase deficiency.  NO--Prior Anesthesia Complication  NO--Latex Allergy  NO--Loose teeth  NO--History of Rheumatoid Arthritis  NO--FIORDALIZA  NO--Bleeding risk  NO--Other_____

## 2022-12-09 NOTE — H&P PST ADULT - NSICDXPASTSURGICALHX_GEN_ALL_CORE_FT
PAST SURGICAL HISTORY:  H/O ovarian cystectomy     Port-A-Cath in place 10 yrs    S/P lumpectomy of breast      PAST SURGICAL HISTORY:  H/O ovarian cystectomy     H/O total knee replacement     Port-A-Cath in place 10 yrs    S/P lumpectomy of breast

## 2022-12-09 NOTE — H&P PST ADULT - NSICDXFAMILYHX_GEN_ALL_CORE_FT
FAMILY HISTORY:  Father  Still living? No  FH: COPD (chronic obstructive pulmonary disease), Age at diagnosis: Age Unknown     FAMILY HISTORY:  Father  Still living? No  FH: COPD (chronic obstructive pulmonary disease), Age at diagnosis: Age Unknown    Mother  Still living? Unknown  FH: breast cancer, Age at diagnosis: Age Unknown

## 2022-12-12 ENCOUNTER — OUTPATIENT (OUTPATIENT)
Dept: OUTPATIENT SERVICES | Facility: HOSPITAL | Age: 72
LOS: 1 days | Discharge: HOME | End: 2022-12-12

## 2022-12-12 ENCOUNTER — RESULT REVIEW (OUTPATIENT)
Age: 72
End: 2022-12-12

## 2022-12-12 DIAGNOSIS — C54.1 MALIGNANT NEOPLASM OF ENDOMETRIUM: ICD-10-CM

## 2022-12-12 DIAGNOSIS — Z95.828 PRESENCE OF OTHER VASCULAR IMPLANTS AND GRAFTS: Chronic | ICD-10-CM

## 2022-12-12 DIAGNOSIS — Z98.890 OTHER SPECIFIED POSTPROCEDURAL STATES: Chronic | ICD-10-CM

## 2022-12-12 DIAGNOSIS — Z96.659 PRESENCE OF UNSPECIFIED ARTIFICIAL KNEE JOINT: Chronic | ICD-10-CM

## 2022-12-12 PROCEDURE — 74177 CT ABD & PELVIS W/CONTRAST: CPT | Mod: 26

## 2022-12-13 LAB — CORE LAB BIOPSY: NORMAL

## 2022-12-14 NOTE — ASU PATIENT PROFILE, ADULT - NSICDXPASTSURGICALHX_GEN_ALL_CORE_FT
PAST SURGICAL HISTORY:  H/O ovarian cystectomy     H/O total knee replacement     Port-A-Cath in place 10 yrs    S/P lumpectomy of breast

## 2022-12-15 ENCOUNTER — TRANSCRIPTION ENCOUNTER (OUTPATIENT)
Age: 72
End: 2022-12-15

## 2022-12-15 ENCOUNTER — RESULT REVIEW (OUTPATIENT)
Age: 72
End: 2022-12-15

## 2022-12-15 ENCOUNTER — APPOINTMENT (OUTPATIENT)
Age: 72
End: 2022-12-15

## 2022-12-15 ENCOUNTER — OUTPATIENT (OUTPATIENT)
Dept: OUTPATIENT SERVICES | Facility: HOSPITAL | Age: 72
LOS: 1 days | Discharge: HOME | End: 2022-12-15
Payer: MEDICARE

## 2022-12-15 VITALS
DIASTOLIC BLOOD PRESSURE: 86 MMHG | HEART RATE: 98 BPM | SYSTOLIC BLOOD PRESSURE: 140 MMHG | RESPIRATION RATE: 18 BRPM | TEMPERATURE: 98 F | OXYGEN SATURATION: 98 % | HEIGHT: 67 IN | WEIGHT: 169.98 LBS

## 2022-12-15 VITALS
RESPIRATION RATE: 18 BRPM | DIASTOLIC BLOOD PRESSURE: 68 MMHG | HEART RATE: 64 BPM | OXYGEN SATURATION: 98 % | SYSTOLIC BLOOD PRESSURE: 133 MMHG

## 2022-12-15 DIAGNOSIS — Z95.828 PRESENCE OF OTHER VASCULAR IMPLANTS AND GRAFTS: Chronic | ICD-10-CM

## 2022-12-15 DIAGNOSIS — Z98.890 OTHER SPECIFIED POSTPROCEDURAL STATES: Chronic | ICD-10-CM

## 2022-12-15 DIAGNOSIS — Z96.659 PRESENCE OF UNSPECIFIED ARTIFICIAL KNEE JOINT: Chronic | ICD-10-CM

## 2022-12-15 LAB
ANION GAP SERPL CALC-SCNC: 10 MMOL/L — SIGNIFICANT CHANGE UP (ref 7–14)
BASOPHILS # BLD AUTO: 0.01 K/UL — SIGNIFICANT CHANGE UP (ref 0–0.2)
BASOPHILS NFR BLD AUTO: 0.1 % — SIGNIFICANT CHANGE UP (ref 0–1)
BUN SERPL-MCNC: 16 MG/DL — SIGNIFICANT CHANGE UP (ref 10–20)
CALCIUM SERPL-MCNC: 8.9 MG/DL — SIGNIFICANT CHANGE UP (ref 8.4–10.4)
CHLORIDE SERPL-SCNC: 102 MMOL/L — SIGNIFICANT CHANGE UP (ref 98–110)
CO2 SERPL-SCNC: 25 MMOL/L — SIGNIFICANT CHANGE UP (ref 17–32)
CREAT SERPL-MCNC: 0.5 MG/DL — LOW (ref 0.7–1.5)
EGFR: 98 ML/MIN/1.73M2 — SIGNIFICANT CHANGE UP
EOSINOPHIL # BLD AUTO: 0 K/UL — SIGNIFICANT CHANGE UP (ref 0–0.7)
EOSINOPHIL NFR BLD AUTO: 0 % — SIGNIFICANT CHANGE UP (ref 0–8)
GLUCOSE SERPL-MCNC: 132 MG/DL — HIGH (ref 70–99)
HCT VFR BLD CALC: 39.6 % — SIGNIFICANT CHANGE UP (ref 37–47)
HGB BLD-MCNC: 13.2 G/DL — SIGNIFICANT CHANGE UP (ref 12–16)
IMM GRANULOCYTES NFR BLD AUTO: 0.4 % — HIGH (ref 0.1–0.3)
LYMPHOCYTES # BLD AUTO: 0.52 K/UL — LOW (ref 1.2–3.4)
LYMPHOCYTES # BLD AUTO: 4.8 % — LOW (ref 20.5–51.1)
MAGNESIUM SERPL-MCNC: 1.8 MG/DL — SIGNIFICANT CHANGE UP (ref 1.8–2.4)
MCHC RBC-ENTMCNC: 29.9 PG — SIGNIFICANT CHANGE UP (ref 27–31)
MCHC RBC-ENTMCNC: 33.3 G/DL — SIGNIFICANT CHANGE UP (ref 32–37)
MCV RBC AUTO: 89.6 FL — SIGNIFICANT CHANGE UP (ref 81–99)
MONOCYTES # BLD AUTO: 0.17 K/UL — SIGNIFICANT CHANGE UP (ref 0.1–0.6)
MONOCYTES NFR BLD AUTO: 1.6 % — LOW (ref 1.7–9.3)
NEUTROPHILS # BLD AUTO: 10.09 K/UL — HIGH (ref 1.4–6.5)
NEUTROPHILS NFR BLD AUTO: 93.1 % — HIGH (ref 42.2–75.2)
NRBC # BLD: 0 /100 WBCS — SIGNIFICANT CHANGE UP (ref 0–0)
PHOSPHATE SERPL-MCNC: 4.1 MG/DL — SIGNIFICANT CHANGE UP (ref 2.1–4.9)
PLATELET # BLD AUTO: 242 K/UL — SIGNIFICANT CHANGE UP (ref 130–400)
POTASSIUM SERPL-MCNC: 4.7 MMOL/L — SIGNIFICANT CHANGE UP (ref 3.5–5)
POTASSIUM SERPL-SCNC: 4.7 MMOL/L — SIGNIFICANT CHANGE UP (ref 3.5–5)
RBC # BLD: 4.42 M/UL — SIGNIFICANT CHANGE UP (ref 4.2–5.4)
RBC # FLD: 12.3 % — SIGNIFICANT CHANGE UP (ref 11.5–14.5)
SODIUM SERPL-SCNC: 137 MMOL/L — SIGNIFICANT CHANGE UP (ref 135–146)
WBC # BLD: 10.83 K/UL — HIGH (ref 4.8–10.8)
WBC # FLD AUTO: 10.83 K/UL — HIGH (ref 4.8–10.8)

## 2022-12-15 PROCEDURE — 88341 IMHCHEM/IMCYTCHM EA ADD ANTB: CPT | Mod: 26

## 2022-12-15 PROCEDURE — 88305 TISSUE EXAM BY PATHOLOGIST: CPT | Mod: 26

## 2022-12-15 PROCEDURE — 58575 LAPS TOT HYST RESJ MAL: CPT

## 2022-12-15 PROCEDURE — 88307 TISSUE EXAM BY PATHOLOGIST: CPT | Mod: 26

## 2022-12-15 PROCEDURE — 88112 CYTOPATH CELL ENHANCE TECH: CPT | Mod: 26

## 2022-12-15 PROCEDURE — 88342 IMHCHEM/IMCYTCHM 1ST ANTB: CPT | Mod: 26

## 2022-12-15 PROCEDURE — 88309 TISSUE EXAM BY PATHOLOGIST: CPT | Mod: 26

## 2022-12-15 RX ORDER — POLYETHYLENE GLYCOL 3350 17 G/17G
17 POWDER, FOR SOLUTION ORAL
Qty: 119 | Refills: 0
Start: 2022-12-15 | End: 2022-12-21

## 2022-12-15 RX ORDER — APIXABAN 2.5 MG/1
1 TABLET, FILM COATED ORAL
Qty: 28 | Refills: 0
Start: 2022-12-15 | End: 2022-12-28

## 2022-12-15 RX ORDER — HYDROMORPHONE HYDROCHLORIDE 2 MG/ML
0.5 INJECTION INTRAMUSCULAR; INTRAVENOUS; SUBCUTANEOUS
Refills: 0 | Status: DISCONTINUED | OUTPATIENT
Start: 2022-12-15 | End: 2022-12-15

## 2022-12-15 RX ORDER — ACETAMINOPHEN 500 MG
2 TABLET ORAL
Qty: 60 | Refills: 0
Start: 2022-12-15 | End: 2022-12-24

## 2022-12-15 RX ORDER — ONDANSETRON 8 MG/1
4 TABLET, FILM COATED ORAL ONCE
Refills: 0 | Status: DISCONTINUED | OUTPATIENT
Start: 2022-12-15 | End: 2022-12-15

## 2022-12-15 RX ORDER — IBUPROFEN 200 MG
1 TABLET ORAL
Qty: 40 | Refills: 0
Start: 2022-12-15 | End: 2022-12-24

## 2022-12-15 RX ADMIN — HYDROMORPHONE HYDROCHLORIDE 0.5 MILLIGRAM(S): 2 INJECTION INTRAMUSCULAR; INTRAVENOUS; SUBCUTANEOUS at 16:00

## 2022-12-15 RX ADMIN — HYDROMORPHONE HYDROCHLORIDE 0.5 MILLIGRAM(S): 2 INJECTION INTRAMUSCULAR; INTRAVENOUS; SUBCUTANEOUS at 16:30

## 2022-12-15 NOTE — CHART NOTE - NSCHARTNOTEFT_GEN_A_CORE
PACU ANESTHESIA ADMISSION NOTE      Procedure:   Post op diagnosis:      ____  Intubated  TV:______       Rate: ______      FiO2: ______    __x__  Patent Airway    ___x_  Full return of protective reflexes    __x__  Full recovery from anesthesia / back to baseline     Vitals:   T:  98.3         R:  18                BP:  164/72                Sat:  99                 P: 88       Mental Status:  __x__ Awake   _____ Alert   ___x__ Drowsy   _____ Sedated    Nausea/Vomiting:  __x__ NO  ______Yes,   See Post - Op Orders          Pain Scale (0-10):  __0___    Treatment: ____ None    ____ See Post - Op/PCA Orders    Post - Operative Fluids:   ____ Oral   ____ See Post - Op Orders    Plan: Discharge:   _x___Home       _____Floor     _____Critical Care    _____  Other:_________________    Comments:

## 2022-12-15 NOTE — BRIEF OPERATIVE NOTE - SPECIMENS
Uterus/cervix, bilateral fallopian tubes and ovaries, part of omentum, left and right pelvic lymph nodes, left and right maura-aortic lymph nodes

## 2022-12-15 NOTE — BRIEF OPERATIVE NOTE - NSICDXBRIEFPROCEDURE_GEN_ALL_CORE_FT
PROCEDURES:  Hysterectomy, total, robot-assisted, laparoscopic, using da Rusty Xi, with salpingo-oophorectomy, for uterus 250 grams or less 15-Dec-2022 15:24:04  Boston Gutierrez  Laparoscopic lymph node dissection 15-Dec-2022 15:25:08 Pelvic and maura-aortic lymph nodes Boston Gutierrez  Laparoscopic omentectomy 15-Dec-2022 15:26:30  Boston Gutierrez

## 2022-12-15 NOTE — ASU PREOP CHECKLIST - NOTHING BY MOUTH SINCE
Pt states that she gets \"yeast infections\" when taking antibiotics and is requesting antifungal medication. MD made aware   14-Dec-2022 20:40

## 2022-12-15 NOTE — BRIEF OPERATIVE NOTE - OPERATION/FINDINGS
Normal external female genitalia, vaginal mucosa, cervix  ICG injected at 3 and 9 oclock positions of the cervix  ~10 cm anteverted uterus  Some adhesions noted on anterior aspect of lower uterine segment to bladder X Size Of Lesion In Cm: 0

## 2022-12-15 NOTE — ASU DISCHARGE PLAN (ADULT/PEDIATRIC) - CARE PROVIDER_API CALL
Perfecto Mae (MD)  Gynecologic Oncology; Obstetrics and Gynecology  [address] [place] [address]  Phone: [phone]  Fax: [phone]  Follow Up Time:

## 2022-12-21 LAB — SURGICAL PATHOLOGY STUDY: SIGNIFICANT CHANGE UP

## 2022-12-22 DIAGNOSIS — E66.9 OBESITY, UNSPECIFIED: ICD-10-CM

## 2022-12-22 DIAGNOSIS — C54.1 MALIGNANT NEOPLASM OF ENDOMETRIUM: ICD-10-CM

## 2022-12-22 DIAGNOSIS — Z85.3 PERSONAL HISTORY OF MALIGNANT NEOPLASM OF BREAST: ICD-10-CM

## 2022-12-22 DIAGNOSIS — N88.8 OTHER SPECIFIED NONINFLAMMATORY DISORDERS OF CERVIX UTERI: ICD-10-CM

## 2022-12-22 DIAGNOSIS — D25.1 INTRAMURAL LEIOMYOMA OF UTERUS: ICD-10-CM

## 2022-12-22 DIAGNOSIS — K21.9 GASTRO-ESOPHAGEAL REFLUX DISEASE WITHOUT ESOPHAGITIS: ICD-10-CM

## 2022-12-22 DIAGNOSIS — Z87.891 PERSONAL HISTORY OF NICOTINE DEPENDENCE: ICD-10-CM

## 2022-12-22 DIAGNOSIS — Z92.21 PERSONAL HISTORY OF ANTINEOPLASTIC CHEMOTHERAPY: ICD-10-CM

## 2022-12-22 DIAGNOSIS — N84.0 POLYP OF CORPUS UTERI: ICD-10-CM

## 2022-12-22 LAB — NON-GYNECOLOGICAL CYTOLOGY STUDY: SIGNIFICANT CHANGE UP

## 2022-12-28 ENCOUNTER — APPOINTMENT (OUTPATIENT)
Dept: GYNECOLOGIC ONCOLOGY | Facility: CLINIC | Age: 72
End: 2022-12-28

## 2022-12-28 VITALS
BODY MASS INDEX: 31.41 KG/M2 | SYSTOLIC BLOOD PRESSURE: 129 MMHG | DIASTOLIC BLOOD PRESSURE: 73 MMHG | WEIGHT: 160 LBS | HEIGHT: 60 IN

## 2022-12-28 DIAGNOSIS — Z85.42 PERSONAL HISTORY OF MALIGNANT NEOPLASM OF OTHER PARTS OF UTERUS: ICD-10-CM

## 2022-12-28 PROCEDURE — 99213 OFFICE O/P EST LOW 20 MIN: CPT | Mod: 24

## 2022-12-28 NOTE — PHYSICAL EXAM
[de-identified] : soft NT/ND incisions DCI [de-identified] : pelvic deferred [Restricted in physically strenuous activity but ambulatory and able to carry out work of a light or sedentary nature] : Status 1- Restricted in physically strenuous activity but ambulatory and able to carry out work of a light or sedentary nature, e.g., light house work, office work

## 2022-12-28 NOTE — HISTORY OF PRESENT ILLNESS
[FreeTextEntry1] : 73 y/o with PMB and high grade cancer on Bx\par \par Jeff/BSO/omentectomy/BPSLNBx and BPALND 12/15/22\par \par No complaints post op

## 2022-12-28 NOTE — DISCUSSION/SUMMARY
[Visit Time ___ Minutes] : [unfilled] minutes [Face to Face Time___ Minutes] : with [unfilled] minutes in face to face consultation. [FreeTextEntry1] : Had a prolonged discussion regarding her results.\par Even though she is stage IB, the histology is carcinosarcoma.\par The risk for recurrence in these tumors are significantly higher and therefore chemotherapy is indicated.\par \par \par /261 have established carbo/taxol as the preferred treatment regimen x 6 cycles.\par Limited role for RT in this disease, would consider VBT \par \par All questions answered, concerns addressed.\par \par Patient to see prior medical oncology provider Dr. Jon for her treatment\par \par Return in 6 weeks for pelvic check

## 2023-01-23 ENCOUNTER — APPOINTMENT (OUTPATIENT)
Dept: ORTHOPEDIC SURGERY | Facility: CLINIC | Age: 73
End: 2023-01-23

## 2023-01-23 ENCOUNTER — APPOINTMENT (OUTPATIENT)
Dept: UROLOGY | Facility: CLINIC | Age: 73
End: 2023-01-23

## 2023-02-08 ENCOUNTER — APPOINTMENT (OUTPATIENT)
Dept: GYNECOLOGIC ONCOLOGY | Facility: CLINIC | Age: 73
End: 2023-02-08

## 2023-02-13 ENCOUNTER — APPOINTMENT (OUTPATIENT)
Dept: ORTHOPEDIC SURGERY | Facility: CLINIC | Age: 73
End: 2023-02-13

## 2023-02-22 ENCOUNTER — APPOINTMENT (OUTPATIENT)
Dept: GYNECOLOGIC ONCOLOGY | Facility: CLINIC | Age: 73
End: 2023-02-22

## 2023-03-13 ENCOUNTER — APPOINTMENT (OUTPATIENT)
Dept: ORTHOPEDIC SURGERY | Facility: CLINIC | Age: 73
End: 2023-03-13

## 2023-03-21 ENCOUNTER — OUTPATIENT (OUTPATIENT)
Dept: OUTPATIENT SERVICES | Facility: HOSPITAL | Age: 73
LOS: 1 days | End: 2023-03-21
Payer: MEDICARE

## 2023-03-21 DIAGNOSIS — Z95.828 PRESENCE OF OTHER VASCULAR IMPLANTS AND GRAFTS: Chronic | ICD-10-CM

## 2023-03-21 DIAGNOSIS — R10.9 UNSPECIFIED ABDOMINAL PAIN: ICD-10-CM

## 2023-03-21 DIAGNOSIS — Z98.890 OTHER SPECIFIED POSTPROCEDURAL STATES: Chronic | ICD-10-CM

## 2023-03-21 DIAGNOSIS — Z00.8 ENCOUNTER FOR OTHER GENERAL EXAMINATION: ICD-10-CM

## 2023-03-21 DIAGNOSIS — R10.2 PELVIC AND PERINEAL PAIN: ICD-10-CM

## 2023-03-21 DIAGNOSIS — Z96.659 PRESENCE OF UNSPECIFIED ARTIFICIAL KNEE JOINT: Chronic | ICD-10-CM

## 2023-03-21 PROCEDURE — 74177 CT ABD & PELVIS W/CONTRAST: CPT | Mod: 26

## 2023-03-21 PROCEDURE — 74177 CT ABD & PELVIS W/CONTRAST: CPT

## 2023-03-22 ENCOUNTER — APPOINTMENT (OUTPATIENT)
Dept: GYNECOLOGIC ONCOLOGY | Facility: CLINIC | Age: 73
End: 2023-03-22
Payer: MEDICARE

## 2023-03-22 VITALS
SYSTOLIC BLOOD PRESSURE: 159 MMHG | BODY MASS INDEX: 27.31 KG/M2 | HEIGHT: 64 IN | DIASTOLIC BLOOD PRESSURE: 79 MMHG | WEIGHT: 160 LBS

## 2023-03-22 DIAGNOSIS — Z96.652 PRESENCE OF LEFT ARTIFICIAL KNEE JOINT: ICD-10-CM

## 2023-03-22 DIAGNOSIS — R10.9 UNSPECIFIED ABDOMINAL PAIN: ICD-10-CM

## 2023-03-22 DIAGNOSIS — R10.2 PELVIC AND PERINEAL PAIN: ICD-10-CM

## 2023-03-22 DIAGNOSIS — Z96.641 PRESENCE OF RIGHT ARTIFICIAL HIP JOINT: ICD-10-CM

## 2023-03-22 PROCEDURE — 99214 OFFICE O/P EST MOD 30 MIN: CPT

## 2023-03-22 NOTE — HISTORY OF PRESENT ILLNESS
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 Saxena Taxol/Carbo x 4 completed in 2/2023\par \par No complaints, alopecia, had neuropathy previously not worse\par \par

## 2023-03-22 NOTE — REVIEW OF SYSTEMS
[Negative] : Musculoskeletal [Rash] : no rash noted [Ulcer] : no ulcer was seen [Syncope] : no syncope noted [Neuropathy] : no neuropathy [Depression] : no depression [Hematuria] : no hematuria [Dysuria] : no dysuria [Abn Vag Bleeding] : no abnormal vaginal bleeding [Incontinence] : incontinence [Normal Sexual Function] : abnormal sexual function [Fatigue] : no fatigue [Recent Wt Gain ___ Lbs] : no recent weight gain [Recent Wt Loss___ Lbs] : recent weight loss of [unfilled] lbs [Chest Pain] : no chest pain [DE JESUS] : no dyspnea on exertion [Wheezing] : no wheezing [SOB on Exertion] : no shortness of breath during exertion [Bloody Stools] : no bloody stools [Muscle Weakness] : no muscle weakness

## 2023-03-22 NOTE — PHYSICAL EXAM
[Normal] : Vagina: Normal [Absent] : Adnexa(ae): Absent [FreeTextEntry1] : NAD [de-identified] : RERE [de-identified] : no edema [de-identified] : soft NT/ND  [de-identified] : cuff intact [de-identified] : no nodularity [Fully active, able to carry on all pre-disease performance without restriction] : Status 0 - Fully active, able to carry on all pre-disease performance without restriction

## 2023-03-22 NOTE — DISCUSSION/SUMMARY
[Visit Time ___ Minutes] : [unfilled] minutes [FreeTextEntry1] : Discussed with patient that optimal chemotherapy would be 6 cycles, will try to communicate with medical oncology about why she only received 4.\par \par At high risk for recurrence (carcinosarcoma, +LVSI) had a negative scan 3/2023\par \par Signs and Sx of recurrence discussed, and questions answered, \par \par Return in 3 months

## 2023-03-29 ENCOUNTER — APPOINTMENT (OUTPATIENT)
Dept: ORTHOPEDIC SURGERY | Facility: CLINIC | Age: 73
End: 2023-03-29

## 2023-04-04 NOTE — DISCHARGE NOTE PROVIDER - DISCHARGE SERVICE FOR PATIENT
on the discharge service for the patient. I have reviewed and made amendments to the documentation where necessary. Bcc Histology Text: There were numerous aggregates of basaloid cells.

## 2023-04-05 NOTE — H&P PST ADULT - VENOUS THROMBOEMBOLISM
PHQ2-9 completed. Patient does not report any feelings of social isolation.    [facility] Brochure and Data Collection Summary for Patients in Inpatient Rehab Facilities given to patient.    no

## 2023-06-07 ENCOUNTER — APPOINTMENT (OUTPATIENT)
Dept: GYNECOLOGIC ONCOLOGY | Facility: CLINIC | Age: 73
End: 2023-06-07

## 2023-06-26 ENCOUNTER — OUTPATIENT (OUTPATIENT)
Dept: OUTPATIENT SERVICES | Facility: HOSPITAL | Age: 73
LOS: 1 days | End: 2023-06-26
Payer: MEDICARE

## 2023-06-26 DIAGNOSIS — Z96.659 PRESENCE OF UNSPECIFIED ARTIFICIAL KNEE JOINT: Chronic | ICD-10-CM

## 2023-06-26 DIAGNOSIS — C56.9 MALIGNANT NEOPLASM OF UNSPECIFIED OVARY: ICD-10-CM

## 2023-06-26 DIAGNOSIS — Z98.890 OTHER SPECIFIED POSTPROCEDURAL STATES: Chronic | ICD-10-CM

## 2023-06-26 DIAGNOSIS — R91.8 OTHER NONSPECIFIC ABNORMAL FINDING OF LUNG FIELD: ICD-10-CM

## 2023-06-26 DIAGNOSIS — R93.7 ABNORMAL FINDINGS ON DIAGNOSTIC IMAGING OF OTHER PARTS OF MUSCULOSKELETAL SYSTEM: ICD-10-CM

## 2023-06-26 DIAGNOSIS — Z95.828 PRESENCE OF OTHER VASCULAR IMPLANTS AND GRAFTS: Chronic | ICD-10-CM

## 2023-06-26 LAB — GLUCOSE BLDC GLUCOMTR-MCNC: 89 MG/DL — SIGNIFICANT CHANGE UP (ref 70–99)

## 2023-06-26 PROCEDURE — A9552: CPT

## 2023-06-26 PROCEDURE — 82962 GLUCOSE BLOOD TEST: CPT

## 2023-06-26 PROCEDURE — 78815 PET IMAGE W/CT SKULL-THIGH: CPT | Mod: 26,PS

## 2023-06-26 PROCEDURE — 78815 PET IMAGE W/CT SKULL-THIGH: CPT | Mod: PS

## 2023-06-27 DIAGNOSIS — R93.7 ABNORMAL FINDINGS ON DIAGNOSTIC IMAGING OF OTHER PARTS OF MUSCULOSKELETAL SYSTEM: ICD-10-CM

## 2023-06-28 ENCOUNTER — APPOINTMENT (OUTPATIENT)
Dept: GYNECOLOGIC ONCOLOGY | Facility: CLINIC | Age: 73
End: 2023-06-28
Payer: MEDICARE

## 2023-06-28 VITALS
HEART RATE: 76 BPM | SYSTOLIC BLOOD PRESSURE: 125 MMHG | HEIGHT: 65 IN | WEIGHT: 160 LBS | BODY MASS INDEX: 26.66 KG/M2 | DIASTOLIC BLOOD PRESSURE: 87 MMHG

## 2023-06-28 DIAGNOSIS — Z00.00 ENCOUNTER FOR GENERAL ADULT MEDICAL EXAMINATION W/OUT ABNORMAL FINDINGS: ICD-10-CM

## 2023-06-28 DIAGNOSIS — R94.8 ABNORMAL RESULTS OF FUNCTION STUDIES OF OTHER ORGANS AND SYSTEMS: ICD-10-CM

## 2023-06-28 DIAGNOSIS — G62.9 POLYNEUROPATHY, UNSPECIFIED: ICD-10-CM

## 2023-06-28 DIAGNOSIS — C54.1 MALIGNANT NEOPLASM OF ENDOMETRIUM: ICD-10-CM

## 2023-06-28 PROCEDURE — 99214 OFFICE O/P EST MOD 30 MIN: CPT

## 2023-06-28 NOTE — PHYSICAL EXAM
[Normal] : Vagina: Normal [Absent] : Adnexa(ae): Absent [FreeTextEntry1] : NAD [de-identified] : RERE [de-identified] : no edema [de-identified] : soft NT/ND  [de-identified] : cuff intact [de-identified] : no nodularity [Fully active, able to carry on all pre-disease performance without restriction] : Status 0 - Fully active, able to carry on all pre-disease performance without restriction

## 2023-06-28 NOTE — DISCUSSION/SUMMARY
[Visit Time ___ Minutes] : [unfilled] minutes [Face to Face Time___ Minutes] : with [unfilled] minutes in face to face consultation. [FreeTextEntry1] : CARLOS clinically, PET was negative for recurrence, but has a FDG avid spot in the breast (h/o breast cancer)\par \par Will obtain mammogram and refer to breast surgeon after results become available\par \par All questions answered, concerns addressed, return in 3 months

## 2023-06-28 NOTE — REVIEW OF SYSTEMS
[Negative] : Musculoskeletal [Rash] : no rash noted [Ulcer] : no ulcer was seen [Syncope] : no syncope noted [Neuropathy] : no neuropathy [Depression] : no depression [Hematuria] : no hematuria [Dysuria] : no dysuria [Abn Vag Bleeding] : no abnormal vaginal bleeding [Incontinence] : incontinence [Normal Sexual Function] : abnormal sexual function [Fatigue] : no fatigue [Recent Wt Gain ___ Lbs] : no recent weight gain [Recent Wt Loss___ Lbs] : no recent weight loss [Chest Pain] : no chest pain [DE JESUS] : no dyspnea on exertion [Wheezing] : no wheezing [SOB on Exertion] : no shortness of breath during exertion [Bloody Stools] : no bloody stools [Muscle Weakness] : no muscle weakness

## 2023-06-28 NOTE — HISTORY OF PRESENT ILLNESS
[FreeTextEntry1] : 72 y/o with stage IB carcinosarcoma of the uterus\par \par Jeff/BSO/omentectomy/BPSLNBx and BPALND 12/15/22\par \par Adjuvant chemotherapy with Dr. Saxena Taxol/Carbo x 6 completed in 5/2023\par \par No complaints, alopecia, had neuropathy previously not worse\par Still limited with her hip (had recent surgery)\par

## 2023-06-28 NOTE — ASSESSMENT
[FreeTextEntry1] : \par \par TECHNIQUE: Approximately 45 minutes after the intravenous administration of 10.8 mCi 18-Fluorine FDG, whole body PET images were acquired from base of skull to mid - thigh. In addition, non-contrast, low dose, non - diagnostic CT was acquired for attenuation correction and anatomic correlation purposes only.\par \par Comparison made with CT abdomen and pelvis 3/21/2023, PET/CT 12/6/2017.\par \par These images reveal the following:\par \par Mildly FDG avid indeterminate left upper medial breast 0.7 cm nodule or intramammary lymph node, max SUV 2.6 (series 3, image 181).\par \par Unchanged left thyroid lobe nonspecific 1 cm FDG avid nodule, max SUV 6.4, previously 6.2.\par \par No other sites of abnormal FDG uptake.\par \par ADDITIONAL CT FINDINGS: Photopenic probable lymphoceles at left common iliac and external iliac chain regions. Post hysterectomy and bilateral salpingooophorectomy. LEFT IJ chemotherapy port. Status post right total hip arthroplasty. Degenerative changes of the spine. Colonic diverticulosis. Small fat-containing umbilical hernia. Unchanged right adrenal gland calcifications.\par \par \par IMPRESSION:\par Since 12/6/2017:\par \par Mildly FDG avid indeterminate LEFT upper medial breast 0.7 cm nodule or intramammary lymph node, max SUV 2.6. Further breast imaging workup is recommended.\par \par Unchanged left thyroid lobe nonspecific 1 cm FDG avid nodule, max SUV 6.4, previously 6.2. Consider further evaluation with thyroid ultrasound.\par \par No other sites of potential suspicious FDG uptake.

## 2023-07-15 ENCOUNTER — OUTPATIENT (OUTPATIENT)
Dept: OUTPATIENT SERVICES | Facility: HOSPITAL | Age: 73
LOS: 1 days | End: 2023-07-15
Payer: MEDICARE

## 2023-07-15 DIAGNOSIS — Z98.890 OTHER SPECIFIED POSTPROCEDURAL STATES: Chronic | ICD-10-CM

## 2023-07-15 DIAGNOSIS — N63.20 UNSPECIFIED LUMP IN THE LEFT BREAST, UNSPECIFIED QUADRANT: ICD-10-CM

## 2023-07-15 DIAGNOSIS — Z96.659 PRESENCE OF UNSPECIFIED ARTIFICIAL KNEE JOINT: Chronic | ICD-10-CM

## 2023-07-15 DIAGNOSIS — Z85.3 PERSONAL HISTORY OF MALIGNANT NEOPLASM OF BREAST: ICD-10-CM

## 2023-07-15 DIAGNOSIS — Z95.828 PRESENCE OF OTHER VASCULAR IMPLANTS AND GRAFTS: Chronic | ICD-10-CM

## 2023-07-15 PROCEDURE — G0279: CPT

## 2023-07-15 PROCEDURE — G0279: CPT | Mod: 26

## 2023-07-15 PROCEDURE — 77066 DX MAMMO INCL CAD BI: CPT | Mod: 26

## 2023-07-15 PROCEDURE — 77066 DX MAMMO INCL CAD BI: CPT

## 2023-07-16 DIAGNOSIS — Z85.3 PERSONAL HISTORY OF MALIGNANT NEOPLASM OF BREAST: ICD-10-CM

## 2023-07-16 DIAGNOSIS — N63.20 UNSPECIFIED LUMP IN THE LEFT BREAST, UNSPECIFIED QUADRANT: ICD-10-CM

## 2023-07-24 NOTE — PHYSICAL THERAPY INITIAL EVALUATION ADULT - PATIENT/FAMILY AGREES WITH PLAN
Patients last appointment 7/24/2023.   Patients next scheduled appointment   Future Appointments   Date Time Provider [address]   8/28/2023 10:15 AM Josefa Neal MD [facility] yes

## 2023-08-03 ENCOUNTER — OUTPATIENT (OUTPATIENT)
Dept: OUTPATIENT SERVICES | Facility: HOSPITAL | Age: 73
LOS: 1 days | End: 2023-08-03
Payer: MEDICARE

## 2023-08-03 DIAGNOSIS — Z98.890 OTHER SPECIFIED POSTPROCEDURAL STATES: Chronic | ICD-10-CM

## 2023-08-03 DIAGNOSIS — R92.8 OTHER ABNORMAL AND INCONCLUSIVE FINDINGS ON DIAGNOSTIC IMAGING OF BREAST: ICD-10-CM

## 2023-08-03 DIAGNOSIS — Z95.828 PRESENCE OF OTHER VASCULAR IMPLANTS AND GRAFTS: Chronic | ICD-10-CM

## 2023-08-03 DIAGNOSIS — Z96.659 PRESENCE OF UNSPECIFIED ARTIFICIAL KNEE JOINT: Chronic | ICD-10-CM

## 2023-08-03 PROCEDURE — 19082 BX BREAST ADD LESION STRTCTC: CPT | Mod: RT

## 2023-08-03 PROCEDURE — 88305 TISSUE EXAM BY PATHOLOGIST: CPT | Mod: 26

## 2023-08-03 PROCEDURE — 19081 BX BREAST 1ST LESION STRTCTC: CPT | Mod: LT

## 2023-08-03 PROCEDURE — A4648: CPT

## 2023-08-03 PROCEDURE — 88305 TISSUE EXAM BY PATHOLOGIST: CPT

## 2023-08-03 PROCEDURE — 88342 IMHCHEM/IMCYTCHM 1ST ANTB: CPT

## 2023-08-03 PROCEDURE — 88360 TUMOR IMMUNOHISTOCHEM/MANUAL: CPT

## 2023-08-03 PROCEDURE — 88342 IMHCHEM/IMCYTCHM 1ST ANTB: CPT | Mod: 26,59

## 2023-08-03 PROCEDURE — 88360 TUMOR IMMUNOHISTOCHEM/MANUAL: CPT | Mod: 26

## 2023-08-03 PROCEDURE — 19081 BX BREAST 1ST LESION STRTCTC: CPT | Mod: RT

## 2023-08-09 LAB — SURGICAL PATHOLOGY STUDY: SIGNIFICANT CHANGE UP

## 2023-08-10 DIAGNOSIS — C50.912 MALIGNANT NEOPLASM OF UNSPECIFIED SITE OF LEFT FEMALE BREAST: ICD-10-CM

## 2023-08-10 DIAGNOSIS — R92.0 MAMMOGRAPHIC MICROCALCIFICATION FOUND ON DIAGNOSTIC IMAGING OF BREAST: ICD-10-CM

## 2023-08-10 DIAGNOSIS — Z17.0 ESTROGEN RECEPTOR POSITIVE STATUS [ER+]: ICD-10-CM

## 2023-08-10 DIAGNOSIS — N63.20 UNSPECIFIED LUMP IN THE LEFT BREAST, UNSPECIFIED QUADRANT: ICD-10-CM

## 2023-08-10 DIAGNOSIS — N60.21 FIBROADENOSIS OF RIGHT BREAST: ICD-10-CM

## 2023-09-20 NOTE — PRE-ANESTHESIA EVALUATION ADULT - HEIGHT IN INCHES
Peripheral Block    Patient location during procedure: OR  Start time: 9/20/2023 9:55 AM  Reason for block: at surgeon's request and post-op pain management  Staffing  Performed by: Ghislaine Pearson MD  Authorized by: Ghislaine Pearson MD    Preanesthetic Checklist  Completed: patient identified, IV checked, site marked, risks and benefits discussed, surgical consent, monitors and equipment checked, pre-op evaluation and timeout performed  Peripheral Block  Patient position: supine  Prep: ChloraPrep  Patient monitoring: continuous pulse oximetry, frequent blood pressure checks and heart rate  Block type: TAP  Laterality: bilateral  Injection technique: single-shot  Procedures: ultrasound guided, Ultrasound guidance required for the procedure to increase accuracy and safety of medication placement and decrease risk of complications.   Ultrasound permanent image savedbupivacaine (PF) (MARCAINE) 0.25 % injection 20 mL - Perineural   20 mL - 9/20/2023 10:00:00 AM  bupivacaine liposomal (EXPAREL) 1.3 % injection 20 mL - Perineural   10 mL - 9/20/2023 10:00:00 AM (10 ml exparel with 20 cc 0.25% bupi each side)  Needle  Needle type: StimupKleer   Needle gauge: 20 G  Needle length: 4 in  Needle localization: ultrasound guidance  Assessment  Injection assessment: frequent aspiration, injected with ease, negative aspiration, no paresthesia on injection, no symptoms of intraneural/intravenous injection, negative for heart rate change, needle tip visualized at all times and incremental injection  Paresthesia pain: none  Post-procedure:  site cleaned  patient tolerated the procedure well with no immediate complications
7

## 2023-09-21 ASSESSMENT — HOOS JR
GOING UP OR DOWN STAIRS: SEVERE
IMPORTED HOOS JR SCORE: 15.0
HOOS JR RAW SCORE: 15
WALKING ON UNEVEN SURFACE: SEVERE
IMPORTED FORM: YES
SITTING: MODERATE
RISING FROM SITTING: SEVERE
LYING IN BED (TURNING OVER, MAINTAINING HIP POSITION): MILD
BENDING TO THE FLOOR TO PICK UP OBJECT: SEVERE

## 2023-12-20 NOTE — ANESTHESIA FOLLOW-UP NOTE - TYPE OF ANESTHESIA
Regional
Show Topical Anesthesia Variable?: Yes
Detail Level: Detailed
Medical Necessity Information: It is in your best interest to select a reason for this procedure from the list below. All of these items fulfill various CMS LCD requirements except the new and changing color options.
Consent: The patient's consent was obtained including but not limited to risks of crusting, scabbing, blistering, scarring, darker or lighter pigmentary change, recurrence, incomplete removal and infection.
Medical Necessity Clause: This procedure was medically necessary because the lesions that were treated were:
Add 52 Modifier (Optional): no
Spray Paint Text: The liquid nitrogen was applied to the skin utilizing a spray paint frosting technique.
Post-Care Instructions: I reviewed with the patient in detail post-care instructions. Patient is to wear sunprotection, and avoid picking at any of the treated lesions. Pt may apply Vaseline to crusted or scabbing areas.

## 2024-02-01 NOTE — REASON FOR VISIT
[facility] Daily Progress Note      Admit Date:  1/31/2024    CC: inferior STEMI    Subjective:  Mr. Bazzi feels well with no chest pain    Objective:   BP (!) 130/91   Pulse 63   Temp 98 °F (36.7 °C) (Oral)   Resp 24   Ht 1.803 m (5' 11\")   Wt 77.5 kg (170 lb 13.7 oz)   SpO2 98%   BMI 23.83 kg/m²     Intake/Output Summary (Last 24 hours) at 2/1/2024 1101  Last data filed at 2/1/2024 0600  Gross per 24 hour   Intake 2697 ml   Output 1200 ml   Net 1497 ml       TELEMETRY: Sinus    Physical Exam:  General:  Awake, alert, NAD  Eyes:  EOMI PERRL anicteric  Skin:  Warm and dry.    Neck:  JVP normal  Chest:  Diminshed.  No Rales.  Cardiovascular:  RRR, Normal S1S2.  No Murmur.  No Rub.  No Gallops.  Abdomen:  Soft NT  + bs  Extremities: No edema  Neuro:  CN II-XII intact.  No focal deficits.  No weakness.  No lateralizing findings.  Psych:  Normal thought and affect.    MSK:  No Cyanosis nor Clubbing.  Symmetrical strength upper and lower extremities    Medications:    sodium chloride flush  5-40 mL IntraVENous 2 times per day    atorvastatin  80 mg Oral Nightly    valsartan  40 mg Oral 2 times per day    clopidogrel  75 mg Oral Daily    aspirin  81 mg Oral Daily      nitroGLYCERIN Stopped (02/01/24 0826)    sodium chloride 5 mL/hr at 01/31/24 1942     sodium chloride flush, sodium chloride, acetaminophen, atropine, morphine, ondansetron, ALPRAZolam    Lab Data:  CBC:   Recent Labs     01/31/24  0738 02/01/24  0506   WBC 10.7 9.0   HGB 14.9 14.1   HCT 44.1 41.5   MCV 90.1 90.9    219     BMP:   Recent Labs     01/31/24  0738 02/01/24  0506    140   K 4.1 3.7    107   CO2 25 22   BUN 12 8   CREATININE 0.9 0.8*     LIVER PROFILE:   Recent Labs     01/31/24  0738   AST 16   ALT 11   BILITOT <0.2   ALKPHOS 71     PT/INR:   Recent Labs     01/31/24  0738   PROTIME 13.7   INR 1.05       Assessment:  Patient Active Problem List    Diagnosis Date Noted    Hematemesis 09/11/2012    STEMI (ST  [FreeTextEntry1] : post op

## 2024-02-29 NOTE — ASSESSMENT
[FreeTextEntry1] : 71 year old female presents to the office for complaints of right hip pain.She is in severe pain in the right hip. She is limited to 2 blocks of ambulation. She has difficulty negotiating stairs, putting on shoes and socks. She ambulates with a cane and pain is 10/10. 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 She clearly would benefit from Sx. She will undergo medical eval and be schedule at earliest convenience. She is aware that the left hip is also arthritic. We will intensionally over length left hip because of that. \par \par The risks, benefits, alternatives of treatment, and aftercare precautions following joint replacement surgery were reviewed with the patient and all questions were answered. Models were used, radiographs reviewed and a brochure was given to the patient. The implant type utilized, including bearing surfaces fixation techniques were reviewed in detail, and the patient chose to proceed with elective joint replacement surgery. The patient's body mass index was recorded in my office notes, and for those patients whose  body mass index of greater than 30, I recommended that they review a weight reduction program with their internist. The importance of smoking cessation was reviewed with all patient's, and for those patients who still smoke, I recommended that they review a smoking cessation program with their internist. 
No

## 2024-08-06 ENCOUNTER — OUTPATIENT (OUTPATIENT)
Dept: OUTPATIENT SERVICES | Facility: HOSPITAL | Age: 74
LOS: 1 days | End: 2024-08-06
Payer: MEDICARE

## 2024-08-06 DIAGNOSIS — Z98.890 OTHER SPECIFIED POSTPROCEDURAL STATES: Chronic | ICD-10-CM

## 2024-08-06 DIAGNOSIS — Z96.659 PRESENCE OF UNSPECIFIED ARTIFICIAL KNEE JOINT: Chronic | ICD-10-CM

## 2024-08-06 DIAGNOSIS — Z95.828 PRESENCE OF OTHER VASCULAR IMPLANTS AND GRAFTS: Chronic | ICD-10-CM

## 2024-08-06 DIAGNOSIS — Z85.3 PERSONAL HISTORY OF MALIGNANT NEOPLASM OF BREAST: ICD-10-CM

## 2024-08-06 DIAGNOSIS — Z12.31 ENCOUNTER FOR SCREENING MAMMOGRAM FOR MALIGNANT NEOPLASM OF BREAST: ICD-10-CM

## 2024-08-06 PROCEDURE — 77066 DX MAMMO INCL CAD BI: CPT | Mod: 26

## 2024-08-06 PROCEDURE — G0279: CPT

## 2024-08-06 PROCEDURE — 76641 ULTRASOUND BREAST COMPLETE: CPT | Mod: 26,50

## 2024-08-06 PROCEDURE — 76641 ULTRASOUND BREAST COMPLETE: CPT | Mod: 50

## 2024-08-06 PROCEDURE — 77066 DX MAMMO INCL CAD BI: CPT

## 2024-08-06 PROCEDURE — G0279: CPT | Mod: 26

## 2024-08-07 DIAGNOSIS — Z85.3 PERSONAL HISTORY OF MALIGNANT NEOPLASM OF BREAST: ICD-10-CM

## 2025-01-16 NOTE — H&P PST ADULT - CIGARETTES, PACKS/DAY
0.5 Consent: The patient's consent was obtained including but not limited to risks of crusting, scabbing, scarring, blistering, darker or lighter pigmentary change, recurrence, incomplete removal and infection. Cantharone Plus Duration Text (Please Remove Duration From Postcare): The patient was instructed to leave the Cantharone Plus on for 6 hours and then wash the area well with soap and water. Include Z78.9 (Other Specified Conditions Influencing Health Status) As An Associated Diagnosis?: No Cantharone Forte Duration Text (Please Remove Duration From Postcare): The patient was instructed to leave the Cantharone Forte on for 6-8 hours and then wash the area well with soap and water. Canthacur Duration Text (Please Remove Duration From Postcare): The patient was instructed to leave the Canthacur on for 6-8 hours and then wash the area well with soap and water. Detail Level: Detailed Strength: Cristy Medical Necessity Clause: This procedure was medically necessary because the lesions that were treated were: Canthacur Ps Duration Text (Please Remove Duration From Postcare): The patient was instructed to leave the Canthacur PS on for 6-8 hours and then wash the area well with soap and water. Cantharone Duration Text (Please Remove Duration From Postcare): The patient was instructed to leave the Cantharone on for 4 hours and then wash the area well with soap and water. Medical Necessity Information: It is in your best interest to select a reason for this procedure from the list below. All of these items fulfill various CMS LCD requirements except the new and changing color options. Curette Text: Prior to application of cantharidin the lesions were lightly pared with a curette. Post-Care Instructions: I reviewed with the patient in detail post-care instructions. The patient understands that the treated areas should be washed off 6 hours after application.

## 2025-03-14 ENCOUNTER — OUTPATIENT (OUTPATIENT)
Dept: OUTPATIENT SERVICES | Facility: HOSPITAL | Age: 75
LOS: 1 days | End: 2025-03-14
Payer: MEDICARE

## 2025-03-14 DIAGNOSIS — Z98.890 OTHER SPECIFIED POSTPROCEDURAL STATES: Chronic | ICD-10-CM

## 2025-03-14 DIAGNOSIS — Z95.828 PRESENCE OF OTHER VASCULAR IMPLANTS AND GRAFTS: Chronic | ICD-10-CM

## 2025-03-14 DIAGNOSIS — Z96.659 PRESENCE OF UNSPECIFIED ARTIFICIAL KNEE JOINT: Chronic | ICD-10-CM

## 2025-03-14 DIAGNOSIS — R92.8 OTHER ABNORMAL AND INCONCLUSIVE FINDINGS ON DIAGNOSTIC IMAGING OF BREAST: ICD-10-CM

## 2025-03-14 PROCEDURE — G0279: CPT | Mod: 26

## 2025-03-14 PROCEDURE — 77065 DX MAMMO INCL CAD UNI: CPT | Mod: LT

## 2025-03-14 PROCEDURE — 77065 DX MAMMO INCL CAD UNI: CPT | Mod: 26,LT

## 2025-03-14 PROCEDURE — G0279: CPT

## 2025-03-15 DIAGNOSIS — R92.8 OTHER ABNORMAL AND INCONCLUSIVE FINDINGS ON DIAGNOSTIC IMAGING OF BREAST: ICD-10-CM

## 2025-06-27 ENCOUNTER — EMERGENCY (EMERGENCY)
Facility: HOSPITAL | Age: 75
LOS: 0 days | Discharge: HOME | End: 2025-06-27
Attending: EMERGENCY MEDICINE
Payer: MEDICARE

## 2025-06-27 VITALS
SYSTOLIC BLOOD PRESSURE: 149 MMHG | DIASTOLIC BLOOD PRESSURE: 69 MMHG | TEMPERATURE: 98 F | OXYGEN SATURATION: 99 % | HEART RATE: 58 BPM | WEIGHT: 179.9 LBS | RESPIRATION RATE: 18 BRPM

## 2025-06-27 DIAGNOSIS — Z96.659 PRESENCE OF UNSPECIFIED ARTIFICIAL KNEE JOINT: Chronic | ICD-10-CM

## 2025-06-27 DIAGNOSIS — Z91.048 OTHER NONMEDICINAL SUBSTANCE ALLERGY STATUS: ICD-10-CM

## 2025-06-27 DIAGNOSIS — M54.50 LOW BACK PAIN, UNSPECIFIED: ICD-10-CM

## 2025-06-27 DIAGNOSIS — Z85.3 PERSONAL HISTORY OF MALIGNANT NEOPLASM OF BREAST: ICD-10-CM

## 2025-06-27 DIAGNOSIS — Z98.890 OTHER SPECIFIED POSTPROCEDURAL STATES: Chronic | ICD-10-CM

## 2025-06-27 DIAGNOSIS — Z53.29 PROCEDURE AND TREATMENT NOT CARRIED OUT BECAUSE OF PATIENT'S DECISION FOR OTHER REASONS: ICD-10-CM

## 2025-06-27 DIAGNOSIS — Z95.828 PRESENCE OF OTHER VASCULAR IMPLANTS AND GRAFTS: Chronic | ICD-10-CM

## 2025-06-27 DIAGNOSIS — Z85.43 PERSONAL HISTORY OF MALIGNANT NEOPLASM OF OVARY: ICD-10-CM

## 2025-06-27 DIAGNOSIS — Z90.710 ACQUIRED ABSENCE OF BOTH CERVIX AND UTERUS: ICD-10-CM

## 2025-06-27 PROCEDURE — 72131 CT LUMBAR SPINE W/O DYE: CPT

## 2025-06-27 PROCEDURE — 99284 EMERGENCY DEPT VISIT MOD MDM: CPT

## 2025-06-27 PROCEDURE — 72131 CT LUMBAR SPINE W/O DYE: CPT | Mod: 26

## 2025-06-27 PROCEDURE — 72192 CT PELVIS W/O DYE: CPT | Mod: 26

## 2025-06-27 PROCEDURE — 72192 CT PELVIS W/O DYE: CPT

## 2025-06-27 PROCEDURE — 99284 EMERGENCY DEPT VISIT MOD MDM: CPT | Mod: 25

## 2025-06-27 RX ORDER — OXYCODONE HYDROCHLORIDE AND ACETAMINOPHEN 10; 325 MG/1; MG/1
1 TABLET ORAL ONCE
Refills: 0 | Status: DISCONTINUED | OUTPATIENT
Start: 2025-06-27 | End: 2025-06-27

## 2025-06-27 RX ORDER — LIDOCAINE HYDROCHLORIDE 20 MG/ML
1 JELLY TOPICAL ONCE
Refills: 0 | Status: COMPLETED | OUTPATIENT
Start: 2025-06-27 | End: 2025-06-27

## 2025-06-27 RX ADMIN — LIDOCAINE HYDROCHLORIDE 1 PATCH: 20 JELLY TOPICAL at 14:21

## 2025-06-27 RX ADMIN — OXYCODONE HYDROCHLORIDE AND ACETAMINOPHEN 1 TABLET(S): 10; 325 TABLET ORAL at 14:21

## 2025-06-27 NOTE — ED PROVIDER NOTE - OBJECTIVE STATEMENT
75-year-old female past medical history significant for breast and ovarian cancer status post surgical resection and hysterectomy presents with right lower back pain for 1 month.  Patient states that pain has been worsening.  She has not taken any medications for pain. states that her pain has been interfering with her ADLs.  She denies having any fevers, chills, abdominal pain, numbness or weakness.  No additional complaints or concerns.

## 2025-06-27 NOTE — ED PROVIDER NOTE - NSICDXFAMILYHX_GEN_ALL_CORE_FT
FAMILY HISTORY:  Father  Still living? No  FH: COPD (chronic obstructive pulmonary disease), Age at diagnosis: Age Unknown    Mother  Still living? Unknown  FH: breast cancer, Age at diagnosis: Age Unknown

## 2025-06-27 NOTE — ED PROVIDER NOTE - PHYSICAL EXAMINATION
CONSTITUTIONAL: well developed, nontoxic appearing, in no acute distress, speaking in full sentences  SKIN: warm, dry, no rash  HEENT: normocephalic, no conjunctival erythema, moist mucous membranes, patent airway  NECK: supple  CV:  regular rate  RESP: normal work of breathing  ABD: nondistended, soft, nontender   MSK: + R paraspinal lumbar tenderness to palpation   NEURO: alert, oriented, grossly unremarkable, no focal neurological deficits   PSYCH: cooperative, appropriate

## 2025-06-27 NOTE — ED PROVIDER NOTE - CLINICAL SUMMARY MEDICAL DECISION MAKING FREE TEXT BOX
pt with hx breast and ovarian cancer sp surgical resections here with back pain 1 mo. located R paraspanial. also has hx r hip pain. denies falls trauma. able to ambulate. not taking meds for pain.   plan for CT r/o pathologic fx, metastasis.

## 2025-07-30 ENCOUNTER — APPOINTMENT (OUTPATIENT)
Dept: ORTHOPEDIC SURGERY | Facility: CLINIC | Age: 75
End: 2025-07-30
Payer: MEDICARE

## 2025-07-30 ENCOUNTER — NON-APPOINTMENT (OUTPATIENT)
Age: 75
End: 2025-07-30

## 2025-07-30 VITALS — WEIGHT: 170 LBS | HEIGHT: 65 IN | BODY MASS INDEX: 28.32 KG/M2

## 2025-07-30 DIAGNOSIS — M17.11 UNILATERAL PRIMARY OSTEOARTHRITIS, RIGHT KNEE: ICD-10-CM

## 2025-07-30 DIAGNOSIS — Z96.641 PRESENCE OF RIGHT ARTIFICIAL HIP JOINT: ICD-10-CM

## 2025-07-30 DIAGNOSIS — Z96.652 PRESENCE OF LEFT ARTIFICIAL KNEE JOINT: ICD-10-CM

## 2025-07-30 DIAGNOSIS — R94.8 ABNORMAL RESULTS OF FUNCTION STUDIES OF OTHER ORGANS AND SYSTEMS: ICD-10-CM

## 2025-07-30 DIAGNOSIS — M16.12 UNILATERAL PRIMARY OSTEOARTHRITIS, LEFT HIP: ICD-10-CM

## 2025-07-30 PROCEDURE — 72100 X-RAY EXAM L-S SPINE 2/3 VWS: CPT

## 2025-07-30 PROCEDURE — 99203 OFFICE O/P NEW LOW 30 MIN: CPT

## 2025-07-30 PROCEDURE — 73522 X-RAY EXAM HIPS BI 3-4 VIEWS: CPT

## 2025-07-30 PROCEDURE — 73564 X-RAY EXAM KNEE 4 OR MORE: CPT | Mod: 50

## 2025-08-11 PROBLEM — M16.12 ARTHRITIS OF LEFT HIP: Status: ACTIVE | Noted: 2025-08-11

## 2025-08-11 PROBLEM — Z96.641 STATUS POST RIGHT HIP REPLACEMENT: Status: ACTIVE | Noted: 2021-11-22

## 2025-08-11 PROBLEM — Z96.652 STATUS POST TOTAL LEFT KNEE REPLACEMENT: Status: ACTIVE | Noted: 2022-10-15

## 2025-08-11 PROBLEM — M17.11 ARTHRITIS OF RIGHT KNEE: Status: ACTIVE | Noted: 2025-08-11

## 2025-08-12 ENCOUNTER — INPATIENT (INPATIENT)
Facility: HOSPITAL | Age: 75
LOS: 1 days | Discharge: HOME | DRG: 690 | End: 2025-08-14
Attending: STUDENT IN AN ORGANIZED HEALTH CARE EDUCATION/TRAINING PROGRAM | Admitting: INTERNAL MEDICINE
Payer: MEDICARE

## 2025-08-12 VITALS
DIASTOLIC BLOOD PRESSURE: 98 MMHG | OXYGEN SATURATION: 100 % | WEIGHT: 169.98 LBS | HEART RATE: 72 BPM | RESPIRATION RATE: 16 BRPM | TEMPERATURE: 98 F | SYSTOLIC BLOOD PRESSURE: 158 MMHG

## 2025-08-12 DIAGNOSIS — N39.0 URINARY TRACT INFECTION, SITE NOT SPECIFIED: ICD-10-CM

## 2025-08-12 DIAGNOSIS — Z98.890 OTHER SPECIFIED POSTPROCEDURAL STATES: Chronic | ICD-10-CM

## 2025-08-12 DIAGNOSIS — Z95.828 PRESENCE OF OTHER VASCULAR IMPLANTS AND GRAFTS: Chronic | ICD-10-CM

## 2025-08-12 DIAGNOSIS — Z96.659 PRESENCE OF UNSPECIFIED ARTIFICIAL KNEE JOINT: Chronic | ICD-10-CM

## 2025-08-12 LAB
ALBUMIN SERPL ELPH-MCNC: 3.8 G/DL — SIGNIFICANT CHANGE UP (ref 3.5–5.2)
ALP SERPL-CCNC: 123 U/L — HIGH (ref 30–115)
ALT FLD-CCNC: 36 U/L — SIGNIFICANT CHANGE UP (ref 0–41)
ANION GAP SERPL CALC-SCNC: 14 MMOL/L — SIGNIFICANT CHANGE UP (ref 7–14)
APPEARANCE UR: ABNORMAL
AST SERPL-CCNC: 49 U/L — HIGH (ref 0–41)
BASOPHILS # BLD AUTO: 0.02 K/UL — SIGNIFICANT CHANGE UP (ref 0–0.2)
BASOPHILS NFR BLD AUTO: 0.2 % — SIGNIFICANT CHANGE UP (ref 0–1)
BILIRUB SERPL-MCNC: 1.1 MG/DL — SIGNIFICANT CHANGE UP (ref 0.2–1.2)
BILIRUB UR-MCNC: NEGATIVE — SIGNIFICANT CHANGE UP
BUN SERPL-MCNC: 24 MG/DL — HIGH (ref 10–20)
CALCIUM SERPL-MCNC: 8.7 MG/DL — SIGNIFICANT CHANGE UP (ref 8.4–10.5)
CHLORIDE SERPL-SCNC: 102 MMOL/L — SIGNIFICANT CHANGE UP (ref 98–110)
CK SERPL-CCNC: 442 U/L — HIGH (ref 0–225)
CO2 SERPL-SCNC: 21 MMOL/L — SIGNIFICANT CHANGE UP (ref 17–32)
COLOR SPEC: YELLOW — SIGNIFICANT CHANGE UP
CREAT SERPL-MCNC: 0.8 MG/DL — SIGNIFICANT CHANGE UP (ref 0.7–1.5)
CRP SERPL-MCNC: 182.6 MG/L — HIGH
DIFF PNL FLD: ABNORMAL
EGFR: 76 ML/MIN/1.73M2 — SIGNIFICANT CHANGE UP
EGFR: 76 ML/MIN/1.73M2 — SIGNIFICANT CHANGE UP
EOSINOPHIL # BLD AUTO: 0.08 K/UL — SIGNIFICANT CHANGE UP (ref 0–0.7)
EOSINOPHIL NFR BLD AUTO: 0.7 % — SIGNIFICANT CHANGE UP (ref 0–8)
GGT SERPL-CCNC: 80 U/L — HIGH (ref 1–40)
GLUCOSE SERPL-MCNC: 157 MG/DL — HIGH (ref 70–99)
GLUCOSE UR QL: NEGATIVE MG/DL — SIGNIFICANT CHANGE UP
HCT VFR BLD CALC: 37 % — SIGNIFICANT CHANGE UP (ref 37–47)
HGB BLD-MCNC: 12.8 G/DL — SIGNIFICANT CHANGE UP (ref 12–16)
IMM GRANULOCYTES NFR BLD AUTO: 0.3 % — SIGNIFICANT CHANGE UP (ref 0.1–0.3)
KETONES UR QL: 15 MG/DL
LEUKOCYTE ESTERASE UR-ACNC: ABNORMAL
LYMPHOCYTES # BLD AUTO: 0.63 K/UL — LOW (ref 1.2–3.4)
LYMPHOCYTES # BLD AUTO: 5.4 % — LOW (ref 20.5–51.1)
MAGNESIUM SERPL-MCNC: 1.9 MG/DL — SIGNIFICANT CHANGE UP (ref 1.8–2.4)
MCHC RBC-ENTMCNC: 31.8 PG — HIGH (ref 27–31)
MCHC RBC-ENTMCNC: 34.6 G/DL — SIGNIFICANT CHANGE UP (ref 32–37)
MCV RBC AUTO: 92 FL — SIGNIFICANT CHANGE UP (ref 81–99)
MONOCYTES # BLD AUTO: 1.05 K/UL — HIGH (ref 0.1–0.6)
MONOCYTES NFR BLD AUTO: 9 % — SIGNIFICANT CHANGE UP (ref 1.7–9.3)
NEUTROPHILS # BLD AUTO: 9.91 K/UL — HIGH (ref 1.4–6.5)
NEUTROPHILS NFR BLD AUTO: 84.4 % — HIGH (ref 42.2–75.2)
NITRITE UR-MCNC: POSITIVE
NRBC BLD AUTO-RTO: 0 /100 WBCS — SIGNIFICANT CHANGE UP (ref 0–0)
NT-PROBNP SERPL-SCNC: 228 PG/ML — SIGNIFICANT CHANGE UP (ref 0–300)
PH UR: 5.5 — SIGNIFICANT CHANGE UP (ref 5–8)
PLATELET # BLD AUTO: 192 K/UL — SIGNIFICANT CHANGE UP (ref 130–400)
PMV BLD: 10.4 FL — SIGNIFICANT CHANGE UP (ref 7.4–10.4)
POTASSIUM SERPL-MCNC: 4.3 MMOL/L — SIGNIFICANT CHANGE UP (ref 3.5–5)
POTASSIUM SERPL-SCNC: 4.3 MMOL/L — SIGNIFICANT CHANGE UP (ref 3.5–5)
PROT SERPL-MCNC: 6.3 G/DL — SIGNIFICANT CHANGE UP (ref 6–8)
PROT UR-MCNC: 30 MG/DL
RBC # BLD: 4.02 M/UL — LOW (ref 4.2–5.4)
RBC # FLD: 12.8 % — SIGNIFICANT CHANGE UP (ref 11.5–14.5)
SODIUM SERPL-SCNC: 137 MMOL/L — SIGNIFICANT CHANGE UP (ref 135–146)
SP GR SPEC: 1.01 — SIGNIFICANT CHANGE UP (ref 1–1.03)
TROPONIN T, HIGH SENSITIVITY RESULT: 21 NG/L — HIGH (ref 6–13)
TROPONIN T, HIGH SENSITIVITY RESULT: 22 NG/L — HIGH (ref 6–13)
UROBILINOGEN FLD QL: 0.2 MG/DL — SIGNIFICANT CHANGE UP (ref 0.2–1)
WBC # BLD: 11.73 K/UL — HIGH (ref 4.8–10.8)
WBC # FLD AUTO: 11.73 K/UL — HIGH (ref 4.8–10.8)

## 2025-08-12 PROCEDURE — 85025 COMPLETE CBC W/AUTO DIFF WBC: CPT

## 2025-08-12 PROCEDURE — 82550 ASSAY OF CK (CPK): CPT

## 2025-08-12 PROCEDURE — 82977 ASSAY OF GGT: CPT

## 2025-08-12 PROCEDURE — 93010 ELECTROCARDIOGRAM REPORT: CPT

## 2025-08-12 PROCEDURE — 36415 COLL VENOUS BLD VENIPUNCTURE: CPT

## 2025-08-12 PROCEDURE — 72197 MRI PELVIS W/O & W/DYE: CPT

## 2025-08-12 PROCEDURE — 71045 X-RAY EXAM CHEST 1 VIEW: CPT | Mod: 26

## 2025-08-12 PROCEDURE — 84443 ASSAY THYROID STIM HORMONE: CPT

## 2025-08-12 PROCEDURE — 99223 1ST HOSP IP/OBS HIGH 75: CPT

## 2025-08-12 PROCEDURE — 72158 MRI LUMBAR SPINE W/O & W/DYE: CPT

## 2025-08-12 PROCEDURE — 86140 C-REACTIVE PROTEIN: CPT

## 2025-08-12 PROCEDURE — 72158 MRI LUMBAR SPINE W/O & W/DYE: CPT | Mod: 26

## 2025-08-12 PROCEDURE — 73521 X-RAY EXAM HIPS BI 2 VIEWS: CPT | Mod: 26

## 2025-08-12 PROCEDURE — 73521 X-RAY EXAM HIPS BI 2 VIEWS: CPT

## 2025-08-12 PROCEDURE — 87040 BLOOD CULTURE FOR BACTERIA: CPT

## 2025-08-12 PROCEDURE — 99285 EMERGENCY DEPT VISIT HI MDM: CPT

## 2025-08-12 PROCEDURE — 85652 RBC SED RATE AUTOMATED: CPT

## 2025-08-12 PROCEDURE — A9579: CPT

## 2025-08-12 PROCEDURE — 73562 X-RAY EXAM OF KNEE 3: CPT | Mod: LT

## 2025-08-12 PROCEDURE — 73562 X-RAY EXAM OF KNEE 3: CPT | Mod: 26,LT

## 2025-08-12 PROCEDURE — 72197 MRI PELVIS W/O & W/DYE: CPT | Mod: 26

## 2025-08-12 PROCEDURE — 80053 COMPREHEN METABOLIC PANEL: CPT

## 2025-08-12 RX ORDER — CEFTRIAXONE 500 MG/1
1000 INJECTION, POWDER, FOR SOLUTION INTRAMUSCULAR; INTRAVENOUS EVERY 24 HOURS
Refills: 0 | Status: DISCONTINUED | OUTPATIENT
Start: 2025-08-12 | End: 2025-08-14

## 2025-08-12 RX ORDER — ACETAMINOPHEN 500 MG/5ML
650 LIQUID (ML) ORAL EVERY 6 HOURS
Refills: 0 | Status: DISCONTINUED | OUTPATIENT
Start: 2025-08-12 | End: 2025-08-13

## 2025-08-12 RX ORDER — LETROZOLE 2.5 MG/1
1 TABLET, FILM COATED ORAL
Refills: 0 | DISCHARGE

## 2025-08-12 RX ORDER — CEFTRIAXONE 500 MG/1
2000 INJECTION, POWDER, FOR SOLUTION INTRAMUSCULAR; INTRAVENOUS ONCE
Refills: 0 | Status: COMPLETED | OUTPATIENT
Start: 2025-08-12 | End: 2025-08-12

## 2025-08-12 RX ORDER — ENOXAPARIN SODIUM 100 MG/ML
70 INJECTION SUBCUTANEOUS EVERY 12 HOURS
Refills: 0 | Status: DISCONTINUED | OUTPATIENT
Start: 2025-08-12 | End: 2025-08-12

## 2025-08-12 RX ORDER — ONDANSETRON HCL/PF 4 MG/2 ML
4 VIAL (ML) INJECTION EVERY 8 HOURS
Refills: 0 | Status: DISCONTINUED | OUTPATIENT
Start: 2025-08-12 | End: 2025-08-14

## 2025-08-12 RX ORDER — B1/B2/B3/B5/B6/B12/VIT C/FOLIC 500-0.5 MG
1 TABLET ORAL DAILY
Refills: 0 | Status: DISCONTINUED | OUTPATIENT
Start: 2025-08-12 | End: 2025-08-14

## 2025-08-12 RX ORDER — DEXAMETHASONE 0.5 MG/1
10 TABLET ORAL ONCE
Refills: 0 | Status: COMPLETED | OUTPATIENT
Start: 2025-08-12 | End: 2025-08-12

## 2025-08-12 RX ORDER — LETROZOLE 2.5 MG/1
2.5 TABLET, FILM COATED ORAL DAILY
Refills: 0 | Status: DISCONTINUED | OUTPATIENT
Start: 2025-08-12 | End: 2025-08-14

## 2025-08-12 RX ORDER — MAGNESIUM, ALUMINUM HYDROXIDE 200-200 MG
30 TABLET,CHEWABLE ORAL EVERY 4 HOURS
Refills: 0 | Status: DISCONTINUED | OUTPATIENT
Start: 2025-08-12 | End: 2025-08-14

## 2025-08-12 RX ORDER — ENOXAPARIN SODIUM 100 MG/ML
40 INJECTION SUBCUTANEOUS EVERY 24 HOURS
Refills: 0 | Status: DISCONTINUED | OUTPATIENT
Start: 2025-08-12 | End: 2025-08-12

## 2025-08-12 RX ORDER — MELATONIN 5 MG
3 TABLET ORAL AT BEDTIME
Refills: 0 | Status: DISCONTINUED | OUTPATIENT
Start: 2025-08-12 | End: 2025-08-14

## 2025-08-12 RX ADMIN — Medication 2 MILLIGRAM(S): at 05:59

## 2025-08-12 RX ADMIN — DEXAMETHASONE 102 MILLIGRAM(S): 0.5 TABLET ORAL at 13:25

## 2025-08-12 RX ADMIN — Medication 1 APPLICATION(S): at 11:35

## 2025-08-12 RX ADMIN — Medication 1 TABLET(S): at 12:15

## 2025-08-12 RX ADMIN — CEFTRIAXONE 100 MILLIGRAM(S): 500 INJECTION, POWDER, FOR SOLUTION INTRAMUSCULAR; INTRAVENOUS at 05:58

## 2025-08-12 RX ADMIN — LETROZOLE 2.5 MILLIGRAM(S): 2.5 TABLET, FILM COATED ORAL at 12:15

## 2025-08-12 RX ADMIN — Medication 40 MILLIGRAM(S): at 13:25

## 2025-08-12 RX ADMIN — CEFTRIAXONE 100 MILLIGRAM(S): 500 INJECTION, POWDER, FOR SOLUTION INTRAMUSCULAR; INTRAVENOUS at 22:04

## 2025-08-13 LAB
ALBUMIN SERPL ELPH-MCNC: 3.5 G/DL — SIGNIFICANT CHANGE UP (ref 3.5–5.2)
ALP SERPL-CCNC: 132 U/L — HIGH (ref 30–115)
ALT FLD-CCNC: 98 U/L — HIGH (ref 0–41)
ANION GAP SERPL CALC-SCNC: 12 MMOL/L — SIGNIFICANT CHANGE UP (ref 7–14)
AST SERPL-CCNC: 81 U/L — HIGH (ref 0–41)
BASOPHILS # BLD AUTO: 0.01 K/UL — SIGNIFICANT CHANGE UP (ref 0–0.2)
BASOPHILS NFR BLD AUTO: 0.1 % — SIGNIFICANT CHANGE UP (ref 0–1)
BILIRUB SERPL-MCNC: 0.5 MG/DL — SIGNIFICANT CHANGE UP (ref 0.2–1.2)
BUN SERPL-MCNC: 19 MG/DL — SIGNIFICANT CHANGE UP (ref 10–20)
CALCIUM SERPL-MCNC: 8.5 MG/DL — SIGNIFICANT CHANGE UP (ref 8.4–10.5)
CHLORIDE SERPL-SCNC: 100 MMOL/L — SIGNIFICANT CHANGE UP (ref 98–110)
CO2 SERPL-SCNC: 24 MMOL/L — SIGNIFICANT CHANGE UP (ref 17–32)
CREAT SERPL-MCNC: 0.7 MG/DL — SIGNIFICANT CHANGE UP (ref 0.7–1.5)
EGFR: 89 ML/MIN/1.73M2 — SIGNIFICANT CHANGE UP
EGFR: 89 ML/MIN/1.73M2 — SIGNIFICANT CHANGE UP
EOSINOPHIL # BLD AUTO: 0 K/UL — SIGNIFICANT CHANGE UP (ref 0–0.7)
EOSINOPHIL NFR BLD AUTO: 0 % — SIGNIFICANT CHANGE UP (ref 0–8)
ERYTHROCYTE [SEDIMENTATION RATE] IN BLOOD: 115 MM/HR — HIGH (ref 0–20)
GLUCOSE SERPL-MCNC: 121 MG/DL — HIGH (ref 70–99)
HCT VFR BLD CALC: 38 % — SIGNIFICANT CHANGE UP (ref 37–47)
HGB BLD-MCNC: 12.9 G/DL — SIGNIFICANT CHANGE UP (ref 12–16)
IMM GRANULOCYTES NFR BLD AUTO: 0.6 % — HIGH (ref 0.1–0.3)
LYMPHOCYTES # BLD AUTO: 0.73 K/UL — LOW (ref 1.2–3.4)
LYMPHOCYTES # BLD AUTO: 7.1 % — LOW (ref 20.5–51.1)
MCHC RBC-ENTMCNC: 31.5 PG — HIGH (ref 27–31)
MCHC RBC-ENTMCNC: 33.9 G/DL — SIGNIFICANT CHANGE UP (ref 32–37)
MCV RBC AUTO: 92.7 FL — SIGNIFICANT CHANGE UP (ref 81–99)
MONOCYTES # BLD AUTO: 0.84 K/UL — HIGH (ref 0.1–0.6)
MONOCYTES NFR BLD AUTO: 8.1 % — SIGNIFICANT CHANGE UP (ref 1.7–9.3)
NEUTROPHILS # BLD AUTO: 8.69 K/UL — HIGH (ref 1.4–6.5)
NEUTROPHILS NFR BLD AUTO: 84.1 % — HIGH (ref 42.2–75.2)
NRBC BLD AUTO-RTO: 0 /100 WBCS — SIGNIFICANT CHANGE UP (ref 0–0)
PLATELET # BLD AUTO: 228 K/UL — SIGNIFICANT CHANGE UP (ref 130–400)
PMV BLD: 10.7 FL — HIGH (ref 7.4–10.4)
POTASSIUM SERPL-MCNC: 3.8 MMOL/L — SIGNIFICANT CHANGE UP (ref 3.5–5)
POTASSIUM SERPL-SCNC: 3.8 MMOL/L — SIGNIFICANT CHANGE UP (ref 3.5–5)
PROT SERPL-MCNC: 6.3 G/DL — SIGNIFICANT CHANGE UP (ref 6–8)
RBC # BLD: 4.1 M/UL — LOW (ref 4.2–5.4)
RBC # FLD: 12.7 % — SIGNIFICANT CHANGE UP (ref 11.5–14.5)
SODIUM SERPL-SCNC: 136 MMOL/L — SIGNIFICANT CHANGE UP (ref 135–146)
WBC # BLD: 10.33 K/UL — SIGNIFICANT CHANGE UP (ref 4.8–10.8)
WBC # FLD AUTO: 10.33 K/UL — SIGNIFICANT CHANGE UP (ref 4.8–10.8)

## 2025-08-13 PROCEDURE — 99222 1ST HOSP IP/OBS MODERATE 55: CPT | Mod: 25

## 2025-08-13 PROCEDURE — 99232 SBSQ HOSP IP/OBS MODERATE 35: CPT

## 2025-08-13 PROCEDURE — 99222 1ST HOSP IP/OBS MODERATE 55: CPT

## 2025-08-13 RX ORDER — ENOXAPARIN SODIUM 100 MG/ML
40 INJECTION SUBCUTANEOUS EVERY 24 HOURS
Refills: 0 | Status: DISCONTINUED | OUTPATIENT
Start: 2025-08-13 | End: 2025-08-14

## 2025-08-13 RX ORDER — ACETAMINOPHEN 500 MG/5ML
975 LIQUID (ML) ORAL EVERY 8 HOURS
Refills: 0 | Status: DISCONTINUED | OUTPATIENT
Start: 2025-08-13 | End: 2025-08-14

## 2025-08-13 RX ORDER — POLYETHYLENE GLYCOL 3350 17 G/17G
17 POWDER, FOR SOLUTION ORAL DAILY
Refills: 0 | Status: DISCONTINUED | OUTPATIENT
Start: 2025-08-13 | End: 2025-08-14

## 2025-08-13 RX ORDER — POLYETHYLENE GLYCOL 3350 17 G/17G
17 POWDER, FOR SOLUTION ORAL DAILY
Refills: 0 | Status: DISCONTINUED | OUTPATIENT
Start: 2025-08-13 | End: 2025-08-13

## 2025-08-13 RX ORDER — CELECOXIB 50 MG/1
100 CAPSULE ORAL EVERY 12 HOURS
Refills: 0 | Status: DISCONTINUED | OUTPATIENT
Start: 2025-08-13 | End: 2025-08-14

## 2025-08-13 RX ADMIN — Medication 40 MILLIGRAM(S): at 06:45

## 2025-08-13 RX ADMIN — ENOXAPARIN SODIUM 40 MILLIGRAM(S): 100 INJECTION SUBCUTANEOUS at 17:22

## 2025-08-13 RX ADMIN — Medication 1 TABLET(S): at 11:20

## 2025-08-13 RX ADMIN — LETROZOLE 2.5 MILLIGRAM(S): 2.5 TABLET, FILM COATED ORAL at 11:19

## 2025-08-13 RX ADMIN — Medication 1 APPLICATION(S): at 11:14

## 2025-08-13 RX ADMIN — POLYETHYLENE GLYCOL 3350 17 GRAM(S): 17 POWDER, FOR SOLUTION ORAL at 06:44

## 2025-08-13 RX ADMIN — CEFTRIAXONE 100 MILLIGRAM(S): 500 INJECTION, POWDER, FOR SOLUTION INTRAMUSCULAR; INTRAVENOUS at 21:18

## 2025-08-14 ENCOUNTER — TRANSCRIPTION ENCOUNTER (OUTPATIENT)
Age: 75
End: 2025-08-14

## 2025-08-14 VITALS
RESPIRATION RATE: 18 BRPM | HEART RATE: 65 BPM | DIASTOLIC BLOOD PRESSURE: 69 MMHG | TEMPERATURE: 98 F | SYSTOLIC BLOOD PRESSURE: 135 MMHG

## 2025-08-14 LAB
ALBUMIN SERPL ELPH-MCNC: 3.1 G/DL — LOW (ref 3.5–5.2)
ALP SERPL-CCNC: 138 U/L — HIGH (ref 30–115)
ALT FLD-CCNC: 167 U/L — HIGH (ref 0–41)
ANION GAP SERPL CALC-SCNC: 10 MMOL/L — SIGNIFICANT CHANGE UP (ref 7–14)
AST SERPL-CCNC: 148 U/L — HIGH (ref 0–41)
BILIRUB SERPL-MCNC: 0.4 MG/DL — SIGNIFICANT CHANGE UP (ref 0.2–1.2)
BUN SERPL-MCNC: 23 MG/DL — HIGH (ref 10–20)
CALCIUM SERPL-MCNC: 7.8 MG/DL — LOW (ref 8.4–10.5)
CHLORIDE SERPL-SCNC: 104 MMOL/L — SIGNIFICANT CHANGE UP (ref 98–110)
CO2 SERPL-SCNC: 23 MMOL/L — SIGNIFICANT CHANGE UP (ref 17–32)
CREAT SERPL-MCNC: 0.7 MG/DL — SIGNIFICANT CHANGE UP (ref 0.7–1.5)
EGFR: 89 ML/MIN/1.73M2 — SIGNIFICANT CHANGE UP
EGFR: 89 ML/MIN/1.73M2 — SIGNIFICANT CHANGE UP
GLUCOSE SERPL-MCNC: 108 MG/DL — HIGH (ref 70–99)
POTASSIUM SERPL-MCNC: 4 MMOL/L — SIGNIFICANT CHANGE UP (ref 3.5–5)
POTASSIUM SERPL-SCNC: 4 MMOL/L — SIGNIFICANT CHANGE UP (ref 3.5–5)
PROT SERPL-MCNC: 5.4 G/DL — LOW (ref 6–8)
SODIUM SERPL-SCNC: 137 MMOL/L — SIGNIFICANT CHANGE UP (ref 135–146)
TSH SERPL-MCNC: 2.7 UIU/ML — SIGNIFICANT CHANGE UP (ref 0.27–4.2)

## 2025-08-14 PROCEDURE — 99239 HOSP IP/OBS DSCHRG MGMT >30: CPT

## 2025-08-14 RX ORDER — ACETAMINOPHEN 500 MG/5ML
650 LIQUID (ML) ORAL EVERY 6 HOURS
Refills: 0 | Status: DISCONTINUED | OUTPATIENT
Start: 2025-08-14 | End: 2025-08-14

## 2025-08-14 RX ORDER — CIPROFLOXACIN HCL 250 MG
1 TABLET ORAL
Qty: 10 | Refills: 0
Start: 2025-08-14 | End: 2025-08-18

## 2025-08-14 RX ORDER — LIDOCAINE HYDROCHLORIDE 20 MG/ML
1 JELLY TOPICAL
Qty: 1 | Refills: 0
Start: 2025-08-14 | End: 2025-08-18

## 2025-08-14 RX ORDER — CELECOXIB 50 MG/1
1 CAPSULE ORAL
Qty: 10 | Refills: 0
Start: 2025-08-14 | End: 2025-08-18

## 2025-08-14 RX ORDER — CYCLOBENZAPRINE HYDROCHLORIDE 15 MG/1
1 CAPSULE, EXTENDED RELEASE ORAL
Qty: 15 | Refills: 0
Start: 2025-08-14 | End: 2025-08-18

## 2025-08-14 RX ADMIN — Medication 975 MILLIGRAM(S): at 05:37

## 2025-08-14 RX ADMIN — Medication 650 MILLIGRAM(S): at 10:24

## 2025-08-17 LAB
CULTURE RESULTS: SIGNIFICANT CHANGE UP
CULTURE RESULTS: SIGNIFICANT CHANGE UP
SPECIMEN SOURCE: SIGNIFICANT CHANGE UP
SPECIMEN SOURCE: SIGNIFICANT CHANGE UP

## 2025-08-20 DIAGNOSIS — Z90.722 ACQUIRED ABSENCE OF OVARIES, BILATERAL: ICD-10-CM

## 2025-08-20 DIAGNOSIS — Z90.710 ACQUIRED ABSENCE OF BOTH CERVIX AND UTERUS: ICD-10-CM

## 2025-08-20 DIAGNOSIS — Z85.42 PERSONAL HISTORY OF MALIGNANT NEOPLASM OF OTHER PARTS OF UTERUS: ICD-10-CM

## 2025-08-20 DIAGNOSIS — R29.898 OTHER SYMPTOMS AND SIGNS INVOLVING THE MUSCULOSKELETAL SYSTEM: ICD-10-CM

## 2025-08-20 DIAGNOSIS — M16.32 UNILATERAL OSTEOARTHRITIS RESULTING FROM HIP DYSPLASIA, LEFT HIP: ICD-10-CM

## 2025-08-20 DIAGNOSIS — Z96.641 PRESENCE OF RIGHT ARTIFICIAL HIP JOINT: ICD-10-CM

## 2025-08-20 DIAGNOSIS — Z92.21 PERSONAL HISTORY OF ANTINEOPLASTIC CHEMOTHERAPY: ICD-10-CM

## 2025-08-20 DIAGNOSIS — M25.561 PAIN IN RIGHT KNEE: ICD-10-CM
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
## 2025-08-20 DIAGNOSIS — K76.89 OTHER SPECIFIED DISEASES OF LIVER: ICD-10-CM

## 2025-08-20 DIAGNOSIS — Z53.29 PROCEDURE AND TREATMENT NOT CARRIED OUT BECAUSE OF PATIENT'S DECISION FOR OTHER REASONS: ICD-10-CM

## 2025-08-20 DIAGNOSIS — Z80.3 FAMILY HISTORY OF MALIGNANT NEOPLASM OF BREAST: ICD-10-CM

## 2025-08-20 DIAGNOSIS — N39.0 URINARY TRACT INFECTION, SITE NOT SPECIFIED: ICD-10-CM

## 2025-08-20 DIAGNOSIS — N18.2 CHRONIC KIDNEY DISEASE, STAGE 2 (MILD): ICD-10-CM
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
## 2025-08-20 DIAGNOSIS — E86.0 DEHYDRATION: ICD-10-CM

## 2025-08-20 DIAGNOSIS — K21.9 GASTRO-ESOPHAGEAL REFLUX DISEASE WITHOUT ESOPHAGITIS: ICD-10-CM

## 2025-08-20 DIAGNOSIS — Z96.652 PRESENCE OF LEFT ARTIFICIAL KNEE JOINT: ICD-10-CM

## 2025-08-20 DIAGNOSIS — Z85.43 PERSONAL HISTORY OF MALIGNANT NEOPLASM OF OVARY: ICD-10-CM

## 2025-08-20 DIAGNOSIS — M25.562 PAIN IN LEFT KNEE: ICD-10-CM
